# Patient Record
Sex: MALE | Race: WHITE | NOT HISPANIC OR LATINO | URBAN - METROPOLITAN AREA
[De-identification: names, ages, dates, MRNs, and addresses within clinical notes are randomized per-mention and may not be internally consistent; named-entity substitution may affect disease eponyms.]

---

## 2023-10-03 ENCOUNTER — APPOINTMENT (OUTPATIENT)
Dept: RADIOLOGY | Facility: MEDICAL CENTER | Age: 63
End: 2023-10-03
Payer: COMMERCIAL

## 2023-10-03 VITALS
SYSTOLIC BLOOD PRESSURE: 148 MMHG | HEART RATE: 69 BPM | WEIGHT: 243.8 LBS | BODY MASS INDEX: 36.11 KG/M2 | HEIGHT: 69 IN | DIASTOLIC BLOOD PRESSURE: 80 MMHG

## 2023-10-03 DIAGNOSIS — M25.562 PAIN IN BOTH KNEES, UNSPECIFIED CHRONICITY: ICD-10-CM

## 2023-10-03 DIAGNOSIS — M25.562 CHRONIC PAIN OF BOTH KNEES: ICD-10-CM

## 2023-10-03 DIAGNOSIS — M17.0 BILATERAL PRIMARY OSTEOARTHRITIS OF KNEE: Primary | ICD-10-CM

## 2023-10-03 DIAGNOSIS — G89.29 CHRONIC PAIN OF BOTH KNEES: ICD-10-CM

## 2023-10-03 DIAGNOSIS — M25.561 PAIN IN BOTH KNEES, UNSPECIFIED CHRONICITY: ICD-10-CM

## 2023-10-03 DIAGNOSIS — M25.561 CHRONIC PAIN OF BOTH KNEES: ICD-10-CM

## 2023-10-03 PROCEDURE — 99204 OFFICE O/P NEW MOD 45 MIN: CPT | Performed by: STUDENT IN AN ORGANIZED HEALTH CARE EDUCATION/TRAINING PROGRAM

## 2023-10-03 PROCEDURE — 20610 DRAIN/INJ JOINT/BURSA W/O US: CPT | Performed by: STUDENT IN AN ORGANIZED HEALTH CARE EDUCATION/TRAINING PROGRAM

## 2023-10-03 PROCEDURE — 77073 BONE LENGTH STUDIES: CPT

## 2023-10-03 PROCEDURE — 73564 X-RAY EXAM KNEE 4 OR MORE: CPT

## 2023-10-03 RX ORDER — EMPAGLIFLOZIN, METFORMIN HYDROCHLORIDE 12.5; 1 MG/1; MG/1
1 TABLET, EXTENDED RELEASE ORAL DAILY
COMMUNITY
Start: 2023-09-19

## 2023-10-03 RX ORDER — BISOPROLOL FUMARATE AND HYDROCHLOROTHIAZIDE 10; 6.25 MG/1; MG/1
1 TABLET ORAL DAILY
COMMUNITY
Start: 2023-09-19

## 2023-10-03 RX ORDER — ASPIRIN 81 MG/1
81 TABLET ORAL
COMMUNITY

## 2023-10-03 RX ORDER — ROSUVASTATIN CALCIUM 10 MG/1
10 TABLET, COATED ORAL DAILY
COMMUNITY
Start: 2023-09-19

## 2023-10-03 RX ORDER — LISINOPRIL 5 MG/1
5 TABLET ORAL DAILY
COMMUNITY
Start: 2023-09-19

## 2023-10-03 RX ORDER — BUPIVACAINE HYDROCHLORIDE 2.5 MG/ML
4 INJECTION, SOLUTION INFILTRATION; PERINEURAL
Status: COMPLETED | OUTPATIENT
Start: 2023-10-03 | End: 2023-10-03

## 2023-10-03 RX ORDER — TRIAMCINOLONE ACETONIDE 40 MG/ML
40 INJECTION, SUSPENSION INTRA-ARTICULAR; INTRAMUSCULAR
Status: COMPLETED | OUTPATIENT
Start: 2023-10-03 | End: 2023-10-03

## 2023-10-03 RX ORDER — DULAGLUTIDE 1.5 MG/.5ML
INJECTION, SOLUTION SUBCUTANEOUS
COMMUNITY
Start: 2023-09-23

## 2023-10-03 RX ADMIN — TRIAMCINOLONE ACETONIDE 40 MG: 40 INJECTION, SUSPENSION INTRA-ARTICULAR; INTRAMUSCULAR at 13:30

## 2023-10-03 RX ADMIN — BUPIVACAINE HYDROCHLORIDE 4 ML: 2.5 INJECTION, SOLUTION INFILTRATION; PERINEURAL at 13:30

## 2023-10-03 NOTE — PROGRESS NOTES
Knee New Office Note    Assessment:     1. Bilateral primary osteoarthritis of knee    2. Chronic pain of both knees        Plan:     Problem List Items Addressed This Visit    None  Visit Diagnoses     Bilateral primary osteoarthritis of knee    -  Primary    Chronic pain of both knees        Relevant Orders    XR knee 4+ vw right injury    XR knee 4+ vw left injury    XR bone length (scanogram)          Findings today are consistent with bilateral knee osteoarthritis. Imaging and prognosis was reviewed with the patient today. Discussed treatment options including continued observation, low impact exercises, physical therapy, cortisone injection, visco injection, versus surgical intervention. Cortisone steroid injection was administered to the left knee today. Patient should avoid strenuous activities for the next 1-2 days. Patient should avoid vaccines for the next 2 weeks if possible. He should monitor glucose levels for the next 7 to 10 days. Can apply cold compress for soreness. If patient feels relief with the cortisone injections, procedure can be repeated every 3 months. If patient sees minimal/no relief with cortisone injection, treatment with VISCO injections can be further discussed. Consider cortisone injection to the right knee in 3-4 weeks. Subjective:     Patient ID: Allyson Xiong is a 58 y.o. male. Chief Complaint:  HPI:  58 y.o. male presents to the office for evaluation of bilateral knee pain gradually worsening over the past few years. He notes history of left knee arthroscopy with menisectomy several years ago. He is experiencing medial knee pain, left worse than right. His pain worsens with activities. He takes naproxen to control his pain. His pain does wake him up throughout the night. He was seen at [facility] roughly 6 months ago where he received a left knee cortisone injection which was beneficial for a short period of time.      Allergy:  No Known Allergies  Medications:  all current active meds have been reviewed  Past Medical History:  No past medical history on file. Past Surgical History:  No past surgical history on file. Family History:  No family history on file. Social History:  Social History     Substance and Sexual Activity   Alcohol Use Not on file     Social History     Substance and Sexual Activity   Drug Use Not on file     Social History     Tobacco Use   Smoking Status Not on file   Smokeless Tobacco Not on file           ROS:  General: Per HPI  Skin: Negative, except if noted below  HEENT: Negative  Respiratory: Negative  Cardiovascular: Negative  Gastrointestinal: Negative  Urinary: Negative  Vascular: Negative  Musculoskeletal: Positive per HPI   Neurologic: Positive per HPI  Endocrine: Negative    Objective:  BP Readings from Last 1 Encounters:   10/03/23 148/80      Wt Readings from Last 1 Encounters:   10/03/23 111 kg (243 lb 12.8 oz)        Respiratory:   non-labored respirations    Lymphatics:  no palpable lymph nodes    Gait:   antalgic    Neurologic:   Alert and oriented times 3  Patient with normal sensation except as noted below  Deep tendon reflexes 2+ except as noted in MSK exam    Bilateral Lower Extremity:  Left Knee: Inspection:  Skin intact    Overall limb alignment varus    Effusion: none    ROM full with pain    Extensor Lag: none    Palpation: medial Joint line tenderness to palpation    AP Stability at 90 deg stable    M/L stability in full extension stable    M/L stability in midflexion laxity present    Motor: 5/5 Q/HS/TA/GS/P    Pulses: 2+ DP / 2+ PT    SILT DP/SP/S/S/TN    Right knee:      Inspection:  Skin intact    Overall limb alignment mild varus    Effusion: none    ROM full with mild pain    Extensor Lag: none    Palpation: medial Joint line tenderness to palpation    AP Stability at 90 deg stable    M/L stability in full extension stable    M/L stability in midflexion stable    Motor: 5/5 Q/HS/TA/GS/P    Pulses: 2+ DP / 2+ PT    SILT DP/SP/S/S/TN    Imaging:  My interpretation XR AP scanogram/AP bilateral knee/lateral/trejo/sunrise bilateral knee: severe medial joint space narrowing, subchondral sclerosis, subchondral cysts, osteophyte formation. No fracture or dislocation. BMI:   Estimated body mass index is 36 kg/m² as calculated from the following:    Height as of this encounter: 5' 9" (1.753 m). Weight as of this encounter: 111 kg (243 lb 12.8 oz). BSA:   Estimated body surface area is 2.25 meters squared as calculated from the following:    Height as of this encounter: 5' 9" (1.753 m). Weight as of this encounter: 111 kg (243 lb 12.8 oz). Large joint arthrocentesis: L knee  Universal Protocol:  Consent: Verbal consent obtained. Risks and benefits: risks, benefits and alternatives were discussed  Consent given by: patient  Time out: Immediately prior to procedure a "time out" was called to verify the correct patient, procedure, equipment, support staff and site/side marked as required.   Timeout called at: 10/3/2023 2:03 PM.  Patient understanding: patient states understanding of the procedure being performed  Site marked: the operative site was marked  Patient identity confirmed: verbally with patient    Supporting Documentation  Indications: pain   Procedure Details  Location: knee - L knee  Preparation: Patient was prepped and draped in the usual sterile fashion  Needle size: 22 G  Medications administered: 4 mL bupivacaine 0.25 %; 40 mg triamcinolone acetonide 40 mg/mL    Patient tolerance: patient tolerated the procedure well with no immediate complications  Dressing:  Sterile dressing applied          Scribe Attestation    I,:  Loretta Franco am acting as a scribe while in the presence of the attending physician.:       I,:  Yaw Martin DO personally performed the services described in this documentation    as scribed in my presence.:

## 2023-10-31 VITALS
HEART RATE: 60 BPM | SYSTOLIC BLOOD PRESSURE: 155 MMHG | DIASTOLIC BLOOD PRESSURE: 92 MMHG | WEIGHT: 241 LBS | BODY MASS INDEX: 35.7 KG/M2 | HEIGHT: 69 IN

## 2023-10-31 DIAGNOSIS — M17.0 BILATERAL PRIMARY OSTEOARTHRITIS OF KNEE: Primary | ICD-10-CM

## 2023-10-31 DIAGNOSIS — G89.29 CHRONIC PAIN OF BOTH KNEES: ICD-10-CM

## 2023-10-31 DIAGNOSIS — M25.562 CHRONIC PAIN OF BOTH KNEES: ICD-10-CM

## 2023-10-31 DIAGNOSIS — M25.561 CHRONIC PAIN OF BOTH KNEES: ICD-10-CM

## 2023-10-31 PROCEDURE — 99214 OFFICE O/P EST MOD 30 MIN: CPT | Performed by: STUDENT IN AN ORGANIZED HEALTH CARE EDUCATION/TRAINING PROGRAM

## 2023-10-31 RX ORDER — TRANEXAMIC ACID 10 MG/ML
1000 INJECTION, SOLUTION INTRAVENOUS ONCE
OUTPATIENT
Start: 2023-10-31 | End: 2023-10-31

## 2023-10-31 RX ORDER — MULTIVIT-MIN/IRON FUM/FOLIC AC 7.5 MG-4
1 TABLET ORAL DAILY
Qty: 30 TABLET | Refills: 1 | Status: SHIPPED | OUTPATIENT
Start: 2023-10-31

## 2023-10-31 RX ORDER — CEFAZOLIN SODIUM 2 G/50ML
2000 SOLUTION INTRAVENOUS ONCE
OUTPATIENT
Start: 2023-10-31 | End: 2023-10-31

## 2023-10-31 RX ORDER — SODIUM CHLORIDE, SODIUM LACTATE, POTASSIUM CHLORIDE, CALCIUM CHLORIDE 600; 310; 30; 20 MG/100ML; MG/100ML; MG/100ML; MG/100ML
125 INJECTION, SOLUTION INTRAVENOUS CONTINUOUS
OUTPATIENT
Start: 2023-10-31

## 2023-10-31 RX ORDER — ASCORBIC ACID 500 MG
500 TABLET ORAL 2 TIMES DAILY
Qty: 60 TABLET | Refills: 1 | Status: SHIPPED | OUTPATIENT
Start: 2023-10-31

## 2023-10-31 RX ORDER — CHLORHEXIDINE GLUCONATE 4 G/100ML
SOLUTION TOPICAL DAILY PRN
OUTPATIENT
Start: 2023-10-31

## 2023-10-31 RX ORDER — GABAPENTIN 300 MG/1
300 CAPSULE ORAL ONCE
OUTPATIENT
Start: 2023-10-31 | End: 2023-10-31

## 2023-10-31 RX ORDER — FOLIC ACID 1 MG/1
1 TABLET ORAL DAILY
Qty: 30 TABLET | Refills: 1 | Status: SHIPPED | OUTPATIENT
Start: 2023-10-31

## 2023-10-31 RX ORDER — ACETAMINOPHEN 325 MG/1
975 TABLET ORAL ONCE
OUTPATIENT
Start: 2023-10-31 | End: 2023-10-31

## 2023-10-31 RX ORDER — CHLORHEXIDINE GLUCONATE ORAL RINSE 1.2 MG/ML
15 SOLUTION DENTAL ONCE
OUTPATIENT
Start: 2023-10-31 | End: 2023-10-31

## 2023-10-31 RX ORDER — MELATONIN
1000 DAILY
Qty: 60 TABLET | Refills: 0 | Status: SHIPPED | OUTPATIENT
Start: 2023-10-31

## 2023-10-31 NOTE — PROGRESS NOTES
Knee Follow up Office Note    Assessment:     1. Bilateral primary osteoarthritis of knee    2. Chronic pain of both knees          Plan:     Problem List Items Addressed This Visit    None  Visit Diagnoses       Bilateral primary osteoarthritis of knee    -  Primary    Relevant Medications    ascorbic acid (VITAMIN C) 466 MG tablet    folic acid (FOLVITE) 1 mg tablet    Multiple Vitamins-Minerals (multivitamin with minerals) tablet    cholecalciferol (VITAMIN D3) 1,000 units tablet    Other Relevant Orders    MRI knee left  wo contrast    Case request operating room: ARTHROPLASTY KNEE UNICOMPARTMENTAL vs TKA (Completed)    Comprehensive metabolic panel    Hemoglobin A1C W/EAG Estimation    CBC and differential    Anemia Panel w/Reflex    Protime-INR    APTT    Type and screen    Ambulatory referral to [facility]    Ambulatory referral to Physical Therapy    EKG 12 lead    Chronic pain of both knees                57 y/o male with bilateral knee osteoarthritis, left worse than right. Xrays of the left knee reviewed showing severe arthritic changes with decreased joint space and osteophyte formation localized to the medial compartment, as well as varus alignment. On physical exam he has maintained good motion, but with pain localized over the medial joint line with no significant effusion. Discussed treatment options including continued observation, low impact exercises, physical therapy, cortisone injection, visco injection, versus surgical intervention. Cortisone steroid injection only lasted him about 1 day. We discussed trying visco, but with how little relief he had with cortisone, he may not see significant benefit. We did discuss surgical treatment option of medial unicompartmental knee replacement vs TKA. Based on the area of his arthritis, he may be a candidate for a medial compartment uni, however we recommend ordering an MRI to eval further for surgical planning and integrity of ACL.   Patient would like to proceed surgically with UKA in the spring after his vacation in February. The patient has elected to proceed with Left knee medial UKA vs TKA. Risks and benefits of surgery to include but not limited to bleeding, infection, damage to surrounding structures, hardware failure, instability, fracture, dislocation, need for further surgery, continued pain, stiffness, blood clots, stroke, and heart attack was discussed with the patient. Informed consent was signed today in the office. The patient has met with our surgical schedulers and our preoperative joint replacement pathway has been initiated. All questions were answered. Patient will follow-up 2 weeks post operatively. BMI is appropriate at 35.59 and is a nonsmoker. Subjective:     Patient ID: August Wise is a 58 y.o. male. Chief Complaint:  HPI:  58 y.o. male presents to the office for a follow up evaluation of bilateral knee pain gradually worsening over the past few years. At last visit it was discussed that he has OA of the bilateral knees, left worse than right. He notes history of left knee arthroscopy with menisectomy several years ago. He is experiencing medial knee pain, left worse than right. His pain worsens with activities. He takes naproxen to control his pain. His pain does wake him up throughout the night. At his last visit about 1 month ago he was given a cortisone injection into the left knee, which lasted only about 1 day. He is here today to discuss other treatment options. Allergy:  No Known Allergies  Medications:  all current active meds have been reviewed  Past Medical History:  History reviewed. No pertinent past medical history. Past Surgical History:  History reviewed. No pertinent surgical history. Family History:  History reviewed. No pertinent family history.   Social History:  Social History     Substance and Sexual Activity   Alcohol Use Yes     Social History     Substance and Sexual Activity   Drug Use Never     Social History     Tobacco Use   Smoking Status Former    Types: Cigarettes   Smokeless Tobacco Never           ROS:  General: Per HPI  Skin: Negative, except if noted below  HEENT: Negative  Respiratory: Negative  Cardiovascular: Negative  Gastrointestinal: Negative  Urinary: Negative  Vascular: Negative  Musculoskeletal: Positive per HPI   Neurologic: Positive per HPI  Endocrine: Negative    Objective:  BP Readings from Last 1 Encounters:   10/31/23 155/92      Wt Readings from Last 1 Encounters:   10/31/23 109 kg (241 lb)        Respiratory:   non-labored respirations    Lymphatics:  no palpable lymph nodes    Gait:   antalgic    Neurologic:   Alert and oriented times 3  Patient with normal sensation except as noted below  Deep tendon reflexes 2+ except as noted in MSK exam    Bilateral Lower Extremity:  Left Knee: Inspection:  Skin intact    Overall limb alignment varus    Effusion: none    ROM full with pain    Extensor Lag: none    Palpation: medial Joint line tenderness to palpation    AP Stability at 90 deg stable    M/L stability in full extension stable    M/L stability in midflexion laxity present    Motor: 5/5 Q/HS/TA/GS/P    Pulses: 2+ DP / 2+ PT    SILT DP/SP/S/S/TN    Right knee: Inspection:  Skin intact    Overall limb alignment mild varus    Effusion: none    ROM full with mild pain    Extensor Lag: none    Palpation: medial Joint line tenderness to palpation    AP Stability at 90 deg stable    M/L stability in full extension stable    M/L stability in midflexion stable    Motor: 5/5 Q/HS/TA/GS/P    Pulses: 2+ DP / 2+ PT    SILT DP/SP/S/S/TN    Imaging:  My interpretation XR AP scanogram/AP bilateral knee/lateral/trejo/sunrise bilateral knee: severe medial joint space narrowing, subchondral sclerosis, subchondral cysts, osteophyte formation. No fracture or dislocation.      BMI:   Estimated body mass index is 35.59 kg/m² as calculated from the following:    Height as of this encounter: 5' 9" (1.753 m). Weight as of this encounter: 109 kg (241 lb). BSA:   Estimated body surface area is 2.23 meters squared as calculated from the following:    Height as of this encounter: 5' 9" (1.753 m). Weight as of this encounter: 109 kg (241 lb).              Scribe Attestation      I,:  Silvestre Ash PA-C am acting as a scribe while in the presence of the attending physician.:       I,:  Oliver Tipton, DO personally performed the services described in this documentation    as scribed in my presence.:

## 2023-11-07 ENCOUNTER — HOSPITAL ENCOUNTER (OUTPATIENT)
Dept: MRI IMAGING | Facility: CLINIC | Age: 63
Discharge: HOME | End: 2023-11-07
Payer: COMMERCIAL

## 2023-11-07 DIAGNOSIS — M17.0 BILATERAL PRIMARY OSTEOARTHRITIS OF KNEE: ICD-10-CM

## 2023-11-07 PROCEDURE — 73721 MRI JNT OF LWR EXTRE W/O DYE: CPT

## 2023-11-07 PROCEDURE — G1004 CDSM NDSC: HCPCS

## 2024-01-17 ENCOUNTER — TELEPHONE (OUTPATIENT)
Age: 64
End: 2024-01-17

## 2024-01-17 NOTE — TELEPHONE ENCOUNTER
Caller: Balta Monson     Doctor/Office: Not spa     Call regarding :  Ortho     Call was transferred to: Ortho

## 2024-01-17 NOTE — TELEPHONE ENCOUNTER
Caller: patient     Doctor: marely    Reason for call: Received a message that appointment did not get authorized   Please advise     Call back#: [phone]

## 2024-01-22 ENCOUNTER — TELEPHONE (OUTPATIENT)
Dept: OBGYN CLINIC | Facility: HOSPITAL | Age: 64
End: 2024-01-22

## 2024-02-09 LAB
DME PARACHUTE DELIVERY DATE ACTUAL: NORMAL
DME PARACHUTE DELIVERY DATE REQUESTED: NORMAL
DME PARACHUTE ITEM DESCRIPTION: NORMAL
DME PARACHUTE ORDER STATUS: NORMAL
DME PARACHUTE SUPPLIER NAME: NORMAL
DME PARACHUTE SUPPLIER PHONE: NORMAL

## 2024-02-12 PROBLEM — E11.59 HYPERTENSION ASSOCIATED WITH TYPE 2 DIABETES MELLITUS: Status: ACTIVE | Noted: 2017-10-11

## 2024-02-12 PROBLEM — E78.5 HYPERLIPEMIA: Status: ACTIVE | Noted: 2024-02-12

## 2024-02-12 PROBLEM — E66.812 CLASS 2 OBESITY DUE TO EXCESS CALORIES WITH BODY MASS INDEX (BMI) OF 35.0 TO 35.9 IN ADULT: Status: ACTIVE | Noted: 2020-03-06

## 2024-02-12 PROBLEM — J44.9 COPD, SEVERITY TO BE DETERMINED (HCC): Status: ACTIVE | Noted: 2018-09-04

## 2024-02-12 PROBLEM — M17.12 PRIMARY OSTEOARTHRITIS OF LEFT KNEE: Status: ACTIVE | Noted: 2024-02-12

## 2024-02-12 PROBLEM — I15.2 HYPERTENSION ASSOCIATED WITH TYPE 2 DIABETES MELLITUS: Status: ACTIVE | Noted: 2017-10-11

## 2024-02-12 PROBLEM — R80.9 MICROALBUMINURIA DUE TO TYPE 2 DIABETES MELLITUS: Status: ACTIVE | Noted: 2018-09-04

## 2024-02-12 PROBLEM — E66.09 CLASS 2 OBESITY DUE TO EXCESS CALORIES WITH BODY MASS INDEX (BMI) OF 35.0 TO 35.9 IN ADULT: Status: ACTIVE | Noted: 2020-03-06

## 2024-02-12 PROBLEM — E11.29 MICROALBUMINURIA DUE TO TYPE 2 DIABETES MELLITUS: Status: ACTIVE | Noted: 2018-09-04

## 2024-02-19 ENCOUNTER — LAB REQUISITION (OUTPATIENT)
Dept: LAB | Facility: HOSPITAL | Age: 64
End: 2024-02-19
Payer: COMMERCIAL

## 2024-02-19 ENCOUNTER — OFFICE VISIT (OUTPATIENT)
Dept: LAB | Facility: HOSPITAL | Age: 64
End: 2024-02-19
Payer: COMMERCIAL

## 2024-02-19 ENCOUNTER — APPOINTMENT (OUTPATIENT)
Dept: LAB | Facility: HOSPITAL | Age: 64
End: 2024-02-19

## 2024-02-19 DIAGNOSIS — M17.0 BILATERAL PRIMARY OSTEOARTHRITIS OF KNEE: ICD-10-CM

## 2024-02-19 LAB
ABO GROUP BLD: NORMAL
ALBUMIN SERPL BCP-MCNC: 4.3 G/DL (ref 3.5–5)
ALP SERPL-CCNC: 53 U/L (ref 34–104)
ALT SERPL W P-5'-P-CCNC: 47 U/L (ref 7–52)
ANION GAP SERPL CALCULATED.3IONS-SCNC: 6 MMOL/L
APTT PPP: 30 SECONDS (ref 23–37)
AST SERPL W P-5'-P-CCNC: 32 U/L (ref 13–39)
ATRIAL RATE: 0 BPM
ATRIAL RATE: 64 BPM
BASOPHILS # BLD AUTO: 0.02 THOUSANDS/ÂΜL (ref 0–0.1)
BASOPHILS NFR BLD AUTO: 0 % (ref 0–1)
BILIRUB SERPL-MCNC: 0.52 MG/DL (ref 0.2–1)
BLD GP AB SCN SERPL QL: NEGATIVE
BUN SERPL-MCNC: 19 MG/DL (ref 5–25)
CALCIUM SERPL-MCNC: 9.4 MG/DL (ref 8.4–10.2)
CHLORIDE SERPL-SCNC: 105 MMOL/L (ref 96–108)
CO2 SERPL-SCNC: 27 MMOL/L (ref 21–32)
CREAT SERPL-MCNC: 0.75 MG/DL (ref 0.6–1.3)
EOSINOPHIL # BLD AUTO: 0.25 THOUSAND/ÂΜL (ref 0–0.61)
EOSINOPHIL NFR BLD AUTO: 4 % (ref 0–6)
ERYTHROCYTE [DISTWIDTH] IN BLOOD BY AUTOMATED COUNT: 13.6 % (ref 11.6–15.1)
EST. AVERAGE GLUCOSE BLD GHB EST-MCNC: 154 MG/DL
GFR SERPL CREATININE-BSD FRML MDRD: 97 ML/MIN/1.73SQ M
GLUCOSE P FAST SERPL-MCNC: 141 MG/DL (ref 65–99)
HBA1C MFR BLD: 7 %
HCT VFR BLD AUTO: 48 % (ref 36.5–49.3)
HGB BLD-MCNC: 16.1 G/DL (ref 12–17)
IMM GRANULOCYTES # BLD AUTO: 0.02 THOUSAND/UL (ref 0–0.2)
IMM GRANULOCYTES NFR BLD AUTO: 0 % (ref 0–2)
INR PPP: 0.93 (ref 0.84–1.19)
LYMPHOCYTES # BLD AUTO: 2.3 THOUSANDS/ÂΜL (ref 0.6–4.47)
LYMPHOCYTES NFR BLD AUTO: 35 % (ref 14–44)
MCH RBC QN AUTO: 30.3 PG (ref 26.8–34.3)
MCHC RBC AUTO-ENTMCNC: 33.5 G/DL (ref 31.4–37.4)
MCV RBC AUTO: 90 FL (ref 82–98)
MONOCYTES # BLD AUTO: 0.71 THOUSAND/ÂΜL (ref 0.17–1.22)
MONOCYTES NFR BLD AUTO: 11 % (ref 4–12)
NEUTROPHILS # BLD AUTO: 3.31 THOUSANDS/ÂΜL (ref 1.85–7.62)
NEUTS SEG NFR BLD AUTO: 50 % (ref 43–75)
NRBC BLD AUTO-RTO: 0 /100 WBCS
P AXIS: 28 DEGREES
PLATELET # BLD AUTO: 203 THOUSANDS/UL (ref 149–390)
PMV BLD AUTO: 10.3 FL (ref 8.9–12.7)
POTASSIUM SERPL-SCNC: 4.1 MMOL/L (ref 3.5–5.3)
PR INTERVAL: 196 MS
PROT SERPL-MCNC: 7 G/DL (ref 6.4–8.4)
PROTHROMBIN TIME: 13 SECONDS (ref 11.6–14.5)
QRS AXIS: 0 DEGREES
QRS AXIS: 67 DEGREES
QRSD INTERVAL: 0 MS
QRSD INTERVAL: 102 MS
QT INTERVAL: 0 MS
QT INTERVAL: 412 MS
QTC INTERVAL: 0 MS
QTC INTERVAL: 425 MS
RBC # BLD AUTO: 5.31 MILLION/UL (ref 3.88–5.62)
RH BLD: POSITIVE
SODIUM SERPL-SCNC: 138 MMOL/L (ref 135–147)
SPECIMEN EXPIRATION DATE: NORMAL
T WAVE AXIS: 0 DEGREES
T WAVE AXIS: 59 DEGREES
VENTRICULAR RATE: 0 BPM
VENTRICULAR RATE: 64 BPM
WBC # BLD AUTO: 6.61 THOUSAND/UL (ref 4.31–10.16)

## 2024-02-19 PROCEDURE — 83036 HEMOGLOBIN GLYCOSYLATED A1C: CPT

## 2024-02-19 PROCEDURE — 86850 RBC ANTIBODY SCREEN: CPT | Performed by: PHYSICIAN ASSISTANT

## 2024-02-19 PROCEDURE — 80053 COMPREHEN METABOLIC PANEL: CPT

## 2024-02-19 PROCEDURE — 86900 BLOOD TYPING SEROLOGIC ABO: CPT | Performed by: PHYSICIAN ASSISTANT

## 2024-02-19 PROCEDURE — 85610 PROTHROMBIN TIME: CPT

## 2024-02-19 PROCEDURE — 93005 ELECTROCARDIOGRAM TRACING: CPT

## 2024-02-19 PROCEDURE — 86901 BLOOD TYPING SEROLOGIC RH(D): CPT | Performed by: PHYSICIAN ASSISTANT

## 2024-02-19 PROCEDURE — 85025 COMPLETE CBC W/AUTO DIFF WBC: CPT

## 2024-02-19 PROCEDURE — 85730 THROMBOPLASTIN TIME PARTIAL: CPT

## 2024-02-19 PROCEDURE — 36415 COLL VENOUS BLD VENIPUNCTURE: CPT

## 2024-02-19 PROCEDURE — 93010 ELECTROCARDIOGRAM REPORT: CPT | Performed by: INTERNAL MEDICINE

## 2024-02-21 PROBLEM — E11.9 TYPE 2 DIABETES MELLITUS WITHOUT COMPLICATION, WITHOUT LONG-TERM CURRENT USE OF INSULIN (HCC): Status: ACTIVE | Noted: 2024-02-21

## 2024-02-21 NOTE — PROGRESS NOTES
Internal Medicine Pre-Operative Evaluation:     Reason for Visit: Pre-operative Evaluation for Risk Stratification and Optimization    Patient ID: Balta Monson is a 63 y.o. male.     Surgery: Arthroplasty of left knee  Referring Provider: Dr Farrar      Recommendations to Proceed withSurgery    Patient is considered to be Low risk for Medium risk procedure.     After evaluation and discussion with patient with emphasis that all surgery has some degree of inherent risk it is determined this procedure is of acceptable risk  medically.    Patient may proceed with planned procedure      Assessment    Pre-operative Medical Evaluation for planned surgery  Recommendations as listed in PLAN section below  Contact surgical nurse  navigator with any questions regarding preoperative plan or schedule.      Problem List Items Addressed This Visit          Unprioritized    Class 2 obesity due to excess calories with body mass index (BMI) of 35.0 to 35.9 in adult    Hyperlipemia     Continue low cholesterol diet and statin therapy           Hypertension associated with type 2 diabetes mellitus        Stable  Cont current medications as directed  Monitor post operative BP   Hold bisoprolol/hctz/lisinopril the morning of surgery            Primary osteoarthritis of left knee     Recommend ongoing attempts at weight loss  Current BMI meets criteria of <40 per [facility] preoperative qualifications           Type 2 diabetes mellitus without complication, without long-term current use of insulin ([facility])       Lab Results   Component Value Date    HGBA1C 7.0 (H) 02/19/2024     Hold trulicity week before surgery          Other Visit Diagnoses       Preop exam for internal medicine    -  Primary                 Plan:     1. Further preoperative workup as follows:   - none no further testing required may proceed with surgery    2. Medication Management/Recommendations:   - hold ACE / ARB morning of surgery unless given other instructions by  your provider  - hold diuretic / water pill  morning of surgery unless given other instructions by your provider  - Insulin Management: trulicity  - Hold the following oral diabetes medication morning of surgery: synjardy  - hold aspirin 7 days prior to surgery  - avoid use of NSAID such as motrin, advil, aleve for 7 days prior to surgery  - hold all OTC herbal or nutritional supplements 7 days before surgery    3. Patient requires further consultation with:   No Consults Required    4. Discharge Planning / Barriers to Discharge  none identified - patients has post discharge therapy plan in place, transportation arranged for discharge day, adequate family support at home to assist with discharge to home.        Subjective:           History of Present Illness:     Balta Monson is a 63 y.o. male who presents to the office today for a preoperative consultation at the request of surgeon. The patient understands this is an elective procedure and not emergent. They are electing to undergo planned procedure with an understanding that all surgery has inherent risk. They have worked with their surgeon and failed conservative treatment measures. Today they present for preoperative risk assessment and recommendations for optimization in preparation for surgery.    Pt seen in surgical optimization center for upcoming proposed surgery. They have failed previous conservative measures and have elected surgical intervention.     Pt meets presurgical lab and BMI optimization goals.    Upon interview questioning patient is able to perform greater than 4 METs workload in daily life without any reported cardio-pulmonary symptoms.          ROS: No TIA's or unusual headaches, no dysphagia.  No prolonged cough. No dyspnea or chest pain on exertion.  No abdominal pain, change in bowel habits, black or bloody stools.  No urinary tract or BPH symptoms.  Positive reported pain in arthritic joint. Positive difficulty with gait. No skin  "rashes or issues.      Objective:    /78   Ht 5' 9\" (1.753 m)   Wt 110 kg (243 lb 9.6 oz)   BMI 35.97 kg/m²       General Appearance: no distress, conversive  HEENT: PERRLA, conjuctiva normal; oropharynx clear; mucous membranes moist;   Neck:  Supple, no lymphadenopathy or thyromegaly  Lungs: breath sounds normal, normal respiratory effort, no retractions, expiratory effort normal  CV: normal heart sounds S1/S2, PMI normal   ABD: soft non tender, no masses , no hepatic or splenomegaly  EXT: DP pulses intact, no lymphadenopathy, no edema  Skin: normal turgor, normal texture, no rash  Psych: affect normal, mood normal  Neuro: AAOx3        The following portions of the patient's history were reviewed and updated as appropriate: allergies, current medications, past family history, past medical history, past social history, past surgical history and problem list.     Past History:       Past Medical History:   Diagnosis Date   • COPD (chronic obstructive pulmonary disease) (HCC)    • CPAP (continuous positive airway pressure) dependence     Not using at present   • Diabetes mellitus (HCC)    • Hypertension    • Sleep apnea     Past Surgical History:   Procedure Laterality Date   • APPENDECTOMY     • CATARACT EXTRACTION     • KNEE ARTHROSCOPY     • SOFT TISSUE CYST EXCISION      groin          Social History     Tobacco Use   • Smoking status: Former     Current packs/day: 0.00     Types: Cigarettes     Quit date:      Years since quittin.1   • Smokeless tobacco: Never   Vaping Use   • Vaping status: Former   Substance Use Topics   • Alcohol use: Yes     Comment: Weekends 2 x   • Drug use: Never     History reviewed. No pertinent family history.       Allergies:     No Known Allergies     Current Medications:     Current Outpatient Medications   Medication Instructions   • ascorbic acid (VITAMIN C) 500 mg, Oral, 2 times daily   • aspirin (ECOTRIN LOW STRENGTH) 81 mg, Oral   • " "bisoprolol-hydrochlorothiazide (ZIAC) 10-6.25 MG per tablet 1 tablet, Oral, Daily   • cholecalciferol (VITAMIN D3) 1,000 Units, Oral, Daily   • folic acid (FOLVITE) 1 mg, Oral, Daily   • lisinopril (ZESTRIL) 5 mg, Oral, Daily   • Multiple Vitamins-Minerals (multivitamin with minerals) tablet 1 tablet, Oral, Daily   • rosuvastatin (CRESTOR) 10 mg, Oral, Daily   • Synjardy XR 12.5-1000 MG TB24 1 tablet, Oral, Daily   • Trulicity 1.5 MG/0.5ML injection INJECT 1.5 MG UNDER THE SKIN EVERY 7 DAYS           PRE-OP WORKSHEET DATA    Assessment of Pre-Operative Risks     MLJ Quality Hard Stops:  BMI (<40) : Estimated body mass index is 35.97 kg/m² as calculated from the following:    Height as of this encounter: 5' 9\" (1.753 m).    Weight as of this encounter: 110 kg (243 lb 9.6 oz).    Hgb ( >11):   Lab Results   Component Value Date    HGB 16.1 02/19/2024     HbA1c (<7.5) :   Lab Results   Component Value Date    HGBA1C 7.0 (H) 02/19/2024     GFR (>60) (Less then 45 = Nephrology consult):  Estimated Creatinine Clearance: 123.2 mL/min (by C-G formula based on SCr of 0.75 mg/dL).      Active Decompensated Chronic Conditions which would delay surgery  No acutely decompensated medical issues such as recent CVA, MI, new onset arrhythmia, severe aortic stenosis, CHF, uncontrolled COPD       Exercise Capacity: (if less the 4 mets consider functional assessment via cardiac stress testing or consultation)    Able to walk 2 blocks without symptoms?: Yes  Able to walk 1 flights without symptoms?: Yes    Assessment of intra and post operative respiratory, hemodynamic and thrombotic risks     Prior Anesthesia Reactions: No     Personal history of venous thromboembolic disease? No    History of steroid use > 5 mg for >2 weeks within last year? No      The patient's risk factors for cardiac complications include :  none    Balta Monson has an IN HOSPITAL cardiac risk of RCI RISK CLASS I (0 risk factors, risk of major cardiac compl. " appr. 0.5%) based on RCRI calculator    Cardiac Risk Estimation: per the Revised Cardiac Risk Index (Circ. 100:1043, 1999),        Pre-Op Data Reviewed:       Laboratory Results: I have personally reviewed the pertinent laboratory results/reports     EKG:I have personally reviewed pertinent reports.  . I personally reviewed and interpreted available tracings in the electronic medical record    Normal sinus rhythm  Normal ECG  Confirmed by Noah Agosto ([id number]) on 2/19/2024 4:22:44 PM       OLD RECORDS: reviewed old records in the chart review section if EHR on day of visit.    Previous cardiopulmonary studies within the past year:  Echocardiogram: no  Cardiac Catheterization: no  Stress Test: no      Time of visit including pre-visit chart review, visit and post-visit coordination of plan and care , review of pre-surgical lab work, preparation and time spent documenting note in electronic medical record, time spent face-to-face in physical examination answering patient questions by care team 45 minutes             [facility]

## 2024-02-21 NOTE — ASSESSMENT & PLAN NOTE
Lab Results   Component Value Date    HGBA1C 7.0 (H) 02/19/2024     Hold trulicity week before surgery

## 2024-02-21 NOTE — H&P (VIEW-ONLY)
Internal Medicine Pre-Operative Evaluation:     Reason for Visit: Pre-operative Evaluation for Risk Stratification and Optimization    Patient ID: Balta Monson is a 63 y.o. male.     Surgery: Arthroplasty of left knee  Referring Provider: Dr Farrar      Recommendations to Proceed withSurgery    Patient is considered to be Low risk for Medium risk procedure.     After evaluation and discussion with patient with emphasis that all surgery has some degree of inherent risk it is determined this procedure is of acceptable risk  medically.    Patient may proceed with planned procedure      Assessment    Pre-operative Medical Evaluation for planned surgery  Recommendations as listed in PLAN section below  Contact surgical nurse  navigator with any questions regarding preoperative plan or schedule.      Problem List Items Addressed This Visit          Unprioritized    Class 2 obesity due to excess calories with body mass index (BMI) of 35.0 to 35.9 in adult    Hyperlipemia     Continue low cholesterol diet and statin therapy           Hypertension associated with type 2 diabetes mellitus        Stable  Cont current medications as directed  Monitor post operative BP   Hold bisoprolol/hctz/lisinopril the morning of surgery            Primary osteoarthritis of left knee     Recommend ongoing attempts at weight loss  Current BMI meets criteria of <40 per [facility] preoperative qualifications           Type 2 diabetes mellitus without complication, without long-term current use of insulin ([facility])       Lab Results   Component Value Date    HGBA1C 7.0 (H) 02/19/2024     Hold trulicity week before surgery          Other Visit Diagnoses       Preop exam for internal medicine    -  Primary                 Plan:     1. Further preoperative workup as follows:   - none no further testing required may proceed with surgery    2. Medication Management/Recommendations:   - hold ACE / ARB morning of surgery unless given other instructions by  your provider  - hold diuretic / water pill  morning of surgery unless given other instructions by your provider  - Insulin Management: trulicity  - Hold the following oral diabetes medication morning of surgery: synjardy  - hold aspirin 7 days prior to surgery  - avoid use of NSAID such as motrin, advil, aleve for 7 days prior to surgery  - hold all OTC herbal or nutritional supplements 7 days before surgery    3. Patient requires further consultation with:   No Consults Required    4. Discharge Planning / Barriers to Discharge  none identified - patients has post discharge therapy plan in place, transportation arranged for discharge day, adequate family support at home to assist with discharge to home.        Subjective:           History of Present Illness:     Balta Monson is a 63 y.o. male who presents to the office today for a preoperative consultation at the request of surgeon. The patient understands this is an elective procedure and not emergent. They are electing to undergo planned procedure with an understanding that all surgery has inherent risk. They have worked with their surgeon and failed conservative treatment measures. Today they present for preoperative risk assessment and recommendations for optimization in preparation for surgery.    Pt seen in surgical optimization center for upcoming proposed surgery. They have failed previous conservative measures and have elected surgical intervention.     Pt meets presurgical lab and BMI optimization goals.    Upon interview questioning patient is able to perform greater than 4 METs workload in daily life without any reported cardio-pulmonary symptoms.          ROS: No TIA's or unusual headaches, no dysphagia.  No prolonged cough. No dyspnea or chest pain on exertion.  No abdominal pain, change in bowel habits, black or bloody stools.  No urinary tract or BPH symptoms.  Positive reported pain in arthritic joint. Positive difficulty with gait. No skin  "rashes or issues.      Objective:    /78   Ht 5' 9\" (1.753 m)   Wt 110 kg (243 lb 9.6 oz)   BMI 35.97 kg/m²       General Appearance: no distress, conversive  HEENT: PERRLA, conjuctiva normal; oropharynx clear; mucous membranes moist;   Neck:  Supple, no lymphadenopathy or thyromegaly  Lungs: breath sounds normal, normal respiratory effort, no retractions, expiratory effort normal  CV: normal heart sounds S1/S2, PMI normal   ABD: soft non tender, no masses , no hepatic or splenomegaly  EXT: DP pulses intact, no lymphadenopathy, no edema  Skin: normal turgor, normal texture, no rash  Psych: affect normal, mood normal  Neuro: AAOx3        The following portions of the patient's history were reviewed and updated as appropriate: allergies, current medications, past family history, past medical history, past social history, past surgical history and problem list.     Past History:       Past Medical History:   Diagnosis Date   • COPD (chronic obstructive pulmonary disease) (HCC)    • CPAP (continuous positive airway pressure) dependence     Not using at present   • Diabetes mellitus (HCC)    • Hypertension    • Sleep apnea     Past Surgical History:   Procedure Laterality Date   • APPENDECTOMY     • CATARACT EXTRACTION     • KNEE ARTHROSCOPY     • SOFT TISSUE CYST EXCISION      groin          Social History     Tobacco Use   • Smoking status: Former     Current packs/day: 0.00     Types: Cigarettes     Quit date:      Years since quittin.1   • Smokeless tobacco: Never   Vaping Use   • Vaping status: Former   Substance Use Topics   • Alcohol use: Yes     Comment: Weekends 2 x   • Drug use: Never     History reviewed. No pertinent family history.       Allergies:     No Known Allergies     Current Medications:     Current Outpatient Medications   Medication Instructions   • ascorbic acid (VITAMIN C) 500 mg, Oral, 2 times daily   • aspirin (ECOTRIN LOW STRENGTH) 81 mg, Oral   • " "bisoprolol-hydrochlorothiazide (ZIAC) 10-6.25 MG per tablet 1 tablet, Oral, Daily   • cholecalciferol (VITAMIN D3) 1,000 Units, Oral, Daily   • folic acid (FOLVITE) 1 mg, Oral, Daily   • lisinopril (ZESTRIL) 5 mg, Oral, Daily   • Multiple Vitamins-Minerals (multivitamin with minerals) tablet 1 tablet, Oral, Daily   • rosuvastatin (CRESTOR) 10 mg, Oral, Daily   • Synjardy XR 12.5-1000 MG TB24 1 tablet, Oral, Daily   • Trulicity 1.5 MG/0.5ML injection INJECT 1.5 MG UNDER THE SKIN EVERY 7 DAYS           PRE-OP WORKSHEET DATA    Assessment of Pre-Operative Risks     MLJ Quality Hard Stops:  BMI (<40) : Estimated body mass index is 35.97 kg/m² as calculated from the following:    Height as of this encounter: 5' 9\" (1.753 m).    Weight as of this encounter: 110 kg (243 lb 9.6 oz).    Hgb ( >11):   Lab Results   Component Value Date    HGB 16.1 02/19/2024     HbA1c (<7.5) :   Lab Results   Component Value Date    HGBA1C 7.0 (H) 02/19/2024     GFR (>60) (Less then 45 = Nephrology consult):  Estimated Creatinine Clearance: 123.2 mL/min (by C-G formula based on SCr of 0.75 mg/dL).      Active Decompensated Chronic Conditions which would delay surgery  No acutely decompensated medical issues such as recent CVA, MI, new onset arrhythmia, severe aortic stenosis, CHF, uncontrolled COPD       Exercise Capacity: (if less the 4 mets consider functional assessment via cardiac stress testing or consultation)    Able to walk 2 blocks without symptoms?: Yes  Able to walk 1 flights without symptoms?: Yes    Assessment of intra and post operative respiratory, hemodynamic and thrombotic risks     Prior Anesthesia Reactions: No     Personal history of venous thromboembolic disease? No    History of steroid use > 5 mg for >2 weeks within last year? No      The patient's risk factors for cardiac complications include :  none    Balta Monson has an IN HOSPITAL cardiac risk of RCI RISK CLASS I (0 risk factors, risk of major cardiac compl. " appr. 0.5%) based on RCRI calculator    Cardiac Risk Estimation: per the Revised Cardiac Risk Index (Circ. 100:1043, 1999),        Pre-Op Data Reviewed:       Laboratory Results: I have personally reviewed the pertinent laboratory results/reports     EKG:I have personally reviewed pertinent reports.  . I personally reviewed and interpreted available tracings in the electronic medical record    Normal sinus rhythm  Normal ECG  Confirmed by Noah Agosto ([id number]) on 2/19/2024 4:22:44 PM       OLD RECORDS: reviewed old records in the chart review section if EHR on day of visit.    Previous cardiopulmonary studies within the past year:  Echocardiogram: no  Cardiac Catheterization: no  Stress Test: no      Time of visit including pre-visit chart review, visit and post-visit coordination of plan and care , review of pre-surgical lab work, preparation and time spent documenting note in electronic medical record, time spent face-to-face in physical examination answering patient questions by care team 45 minutes             [facility]

## 2024-02-21 NOTE — PRE-PROCEDURE INSTRUCTIONS
Pre-Surgery Instructions:   Medication Instructions    ascorbic acid (VITAMIN C) 500 MG tablet Hold day of surgery.    aspirin (ECOTRIN LOW STRENGTH) 81 mg EC tablet Stop taking 7 days prior to surgery.    bisoprolol-hydrochlorothiazide (ZIAC) 10-6.25 MG per tablet Take night before surgery    cholecalciferol (VITAMIN D3) 1,000 units tablet Hold day of surgery.    folic acid (FOLVITE) 1 mg tablet Hold day of surgery.    lisinopril (ZESTRIL) 5 mg tablet Take night before surgery    Multiple Vitamins-Minerals (multivitamin with minerals) tablet Hold day of surgery.    rosuvastatin (CRESTOR) 10 MG tablet Take night before surgery    Synjardy XR 12.5-1000 MG TB24 Stop taking 4 days prior to surgery. LD 2/28/24    Trulicity 1.5 MG/0.5ML injection Stop taking 7 days prior to surgery. Takes on Sundays, LD 2/25/24      Reviewed Total Joint Program    Medication instructions for day surgery reviewed. Please use only a sip of water to take your instructed medications. Avoid all over the counter vitamins, supplements and NSAIDS for one week prior to surgery per anesthesia guidelines. Tylenol is ok to take as needed.     You will receive a call one business day prior to surgery with an arrival time and hospital directions. If your surgery is scheduled on a Monday, the hospital will be calling you on the Friday prior to your surgery. If you have not heard from anyone by 8pm, please call the hospital supervisor through the hospital  at [phone]. ([place] [phone] or [place] [phone]).    Do not eat or drink anything after midnight the night before your surgery, including candy, mints, lifesavers, or chewing gum. Do not drink alcohol 24hrs before your surgery. Try not to smoke at least 24hrs before your surgery.       Follow the pre surgery showering instructions as listed in the “My Surgical Experience Booklet” or otherwise provided by your surgeon's office. Do not use a blade to shave the surgical area 1  week before surgery. It is okay to use a clean electric clippers up to 24 hours before surgery. Do not apply any lotions, creams, including makeup, cologne, deodorant, or perfumes after showering on the day of your surgery. Do not use dry shampoo, hair spray, hair gel, or any type of hair products.     No contact lenses, eye make-up, or artificial eyelashes. Remove nail polish, including gel polish, and any artificial, gel, or acrylic nails if possible. Remove all jewelry including rings and body piercing jewelry.     Wear causal clothing that is easy to take on and off. Consider your type of surgery.    Keep any valuables, jewelry, piercings at home. Please bring any specially ordered equipment (sling, braces) if indicated.    Arrange for a responsible person to drive you to and from the hospital on the day of your surgery. Please confirm the visitor policy for the day of your procedure when you receive your phone call with an arrival time.     Call the surgeon's office with any new illnesses, exposures, or additional questions prior to surgery.    Please reference your “My Surgical Experience Booklet” for additional information to prepare for your upcoming surgery.

## 2024-02-21 NOTE — ASSESSMENT & PLAN NOTE
Stable  Cont current medications as directed  Monitor post operative BP   Hold bisoprolol/hctz/lisinopril the morning of surgery

## 2024-02-22 ENCOUNTER — OFFICE VISIT (OUTPATIENT)
Age: 64
End: 2024-02-22
Payer: COMMERCIAL

## 2024-02-22 VITALS
WEIGHT: 243.6 LBS | SYSTOLIC BLOOD PRESSURE: 124 MMHG | DIASTOLIC BLOOD PRESSURE: 78 MMHG | HEIGHT: 69 IN | BODY MASS INDEX: 36.08 KG/M2

## 2024-02-22 DIAGNOSIS — M17.12 PRIMARY OSTEOARTHRITIS OF LEFT KNEE: ICD-10-CM

## 2024-02-22 DIAGNOSIS — M17.0 BILATERAL PRIMARY OSTEOARTHRITIS OF KNEE: ICD-10-CM

## 2024-02-22 DIAGNOSIS — E11.9 TYPE 2 DIABETES MELLITUS WITHOUT COMPLICATION, WITHOUT LONG-TERM CURRENT USE OF INSULIN (HCC): ICD-10-CM

## 2024-02-22 DIAGNOSIS — E66.09 CLASS 2 OBESITY DUE TO EXCESS CALORIES WITH BODY MASS INDEX (BMI) OF 35.0 TO 35.9 IN ADULT, UNSPECIFIED WHETHER SERIOUS COMORBIDITY PRESENT: ICD-10-CM

## 2024-02-22 DIAGNOSIS — I15.2 HYPERTENSION ASSOCIATED WITH TYPE 2 DIABETES MELLITUS: ICD-10-CM

## 2024-02-22 DIAGNOSIS — Z01.818 PREOP EXAM FOR INTERNAL MEDICINE: Primary | ICD-10-CM

## 2024-02-22 DIAGNOSIS — E11.59 HYPERTENSION ASSOCIATED WITH TYPE 2 DIABETES MELLITUS: ICD-10-CM

## 2024-02-22 DIAGNOSIS — E78.2 MODERATE MIXED HYPERLIPIDEMIA NOT REQUIRING STATIN THERAPY: ICD-10-CM

## 2024-02-22 PROCEDURE — 99205 OFFICE O/P NEW HI 60 MIN: CPT | Performed by: INTERNAL MEDICINE

## 2024-02-22 NOTE — PATIENT INSTRUCTIONS
Contact surgical nurse  navigator with any questions regarding preoperative plan or schedule.  Stop all over the counter supplements, herbal, naturopathic  medications for 1 week prior to surgery UNLESS prescribed by your surgeon  Hold NSAIDS (i.e. advil, alleve, motrin, ibuprofen, celebrex) minimum 7 days prior to surgery  Hold Asprin minimum 7 days prior to surgery  Recommend using Tylenol ( acetaminophen ) 500mg every eight hours during the first week post discharge in conjunction with any additional pain medicine prescribed by your surgeon  Use bowel medicines prescribed by your surgeon ( colace) daily post op during the first 1-2 weeks to avoid post operative constipation issues  Call [phone] with any post discharge concerns or medical issues  The morning of surgery take only the following medication with small sip of water: Bisoprolol hydrochlorothiazide ( unless taken evening before)  Hold Trulicity 1 week before surgery  Hold lisinopril evening before surgery

## 2024-03-01 ENCOUNTER — ANESTHESIA EVENT (OUTPATIENT)
Dept: PERIOP | Facility: HOSPITAL | Age: 64
End: 2024-03-01
Payer: COMMERCIAL

## 2024-03-04 ENCOUNTER — HOSPITAL ENCOUNTER (OUTPATIENT)
Facility: HOSPITAL | Age: 64
Setting detail: OUTPATIENT SURGERY
Discharge: HOME | End: 2024-03-04
Attending: STUDENT IN AN ORGANIZED HEALTH CARE EDUCATION/TRAINING PROGRAM | Admitting: STUDENT IN AN ORGANIZED HEALTH CARE EDUCATION/TRAINING PROGRAM
Payer: COMMERCIAL

## 2024-03-04 ENCOUNTER — APPOINTMENT (OUTPATIENT)
Dept: RADIOLOGY | Facility: HOSPITAL | Age: 64
End: 2024-03-04
Payer: COMMERCIAL

## 2024-03-04 ENCOUNTER — ANESTHESIA (OUTPATIENT)
Dept: PERIOP | Facility: HOSPITAL | Age: 64
End: 2024-03-04
Payer: COMMERCIAL

## 2024-03-04 VITALS
WEIGHT: 243.61 LBS | HEIGHT: 69 IN | HEART RATE: 57 BPM | RESPIRATION RATE: 16 BRPM | BODY MASS INDEX: 36.08 KG/M2 | OXYGEN SATURATION: 97 % | TEMPERATURE: 97.5 F | SYSTOLIC BLOOD PRESSURE: 150 MMHG | DIASTOLIC BLOOD PRESSURE: 52 MMHG

## 2024-03-04 DIAGNOSIS — M17.12 PRIMARY OSTEOARTHRITIS OF LEFT KNEE: Primary | ICD-10-CM

## 2024-03-04 LAB
ABO GROUP BLD: NORMAL
GLUCOSE SERPL-MCNC: 145 MG/DL (ref 65–140)
GLUCOSE SERPL-MCNC: 182 MG/DL (ref 65–140)
RH BLD: POSITIVE

## 2024-03-04 PROCEDURE — C1776 JOINT DEVICE (IMPLANTABLE): HCPCS | Performed by: STUDENT IN AN ORGANIZED HEALTH CARE EDUCATION/TRAINING PROGRAM

## 2024-03-04 PROCEDURE — 27446 REVISION OF KNEE JOINT: CPT | Performed by: PHYSICIAN ASSISTANT

## 2024-03-04 PROCEDURE — C1713 ANCHOR/SCREW BN/BN,TIS/BN: HCPCS | Performed by: STUDENT IN AN ORGANIZED HEALTH CARE EDUCATION/TRAINING PROGRAM

## 2024-03-04 PROCEDURE — 97163 PT EVAL HIGH COMPLEX 45 MIN: CPT | Performed by: PHYSICAL THERAPIST

## 2024-03-04 PROCEDURE — C9290 INJ, BUPIVACAINE LIPOSOME: HCPCS | Performed by: ANESTHESIOLOGY

## 2024-03-04 PROCEDURE — 97166 OT EVAL MOD COMPLEX 45 MIN: CPT

## 2024-03-04 PROCEDURE — 27446 REVISION OF KNEE JOINT: CPT | Performed by: STUDENT IN AN ORGANIZED HEALTH CARE EDUCATION/TRAINING PROGRAM

## 2024-03-04 PROCEDURE — 82948 REAGENT STRIP/BLOOD GLUCOSE: CPT

## 2024-03-04 PROCEDURE — 73560 X-RAY EXAM OF KNEE 1 OR 2: CPT

## 2024-03-04 DEVICE — SMARTSET HIGH PERFORMANCE MV MEDIUM VISCOSITY BONE CEMENT 40G
Type: IMPLANTABLE DEVICE | Site: KNEE | Status: FUNCTIONAL
Brand: SMARTSET

## 2024-03-04 DEVICE — IMPLANTABLE DEVICE
Type: IMPLANTABLE DEVICE | Site: KNEE | Status: FUNCTIONAL
Brand: OXFORD PARTIAL KNEE SYSTEM

## 2024-03-04 DEVICE — ANATOMIC MENISCAL BEARINGLEFT MEDIAL
Type: IMPLANTABLE DEVICE | Site: KNEE | Status: FUNCTIONAL
Brand: OXFORD PARTIAL KNEE SYSTEM

## 2024-03-04 DEVICE — TWIN PEG FEMORAL
Type: IMPLANTABLE DEVICE | Site: KNEE | Status: FUNCTIONAL
Brand: OXFORD PARTIAL KNEE SYSTEM

## 2024-03-04 RX ORDER — CELECOXIB 200 MG/1
200 CAPSULE ORAL 2 TIMES DAILY
Qty: 60 CAPSULE | Refills: 0 | Status: SHIPPED | OUTPATIENT
Start: 2024-03-04

## 2024-03-04 RX ORDER — SIMETHICONE 80 MG
80 TABLET,CHEWABLE ORAL 4 TIMES DAILY PRN
Status: CANCELLED | OUTPATIENT
Start: 2024-03-04

## 2024-03-04 RX ORDER — TRANEXAMIC ACID 10 MG/ML
1000 INJECTION, SOLUTION INTRAVENOUS ONCE
Status: COMPLETED | OUTPATIENT
Start: 2024-03-04 | End: 2024-03-04

## 2024-03-04 RX ORDER — SODIUM CHLORIDE 9 MG/ML
125 INJECTION, SOLUTION INTRAVENOUS CONTINUOUS
Status: DISCONTINUED | OUTPATIENT
Start: 2024-03-04 | End: 2024-03-04 | Stop reason: HOSPADM

## 2024-03-04 RX ORDER — DOCUSATE SODIUM 100 MG/1
100 CAPSULE, LIQUID FILLED ORAL 2 TIMES DAILY
Status: CANCELLED | OUTPATIENT
Start: 2024-03-04

## 2024-03-04 RX ORDER — ONDANSETRON 2 MG/ML
4 INJECTION INTRAMUSCULAR; INTRAVENOUS ONCE AS NEEDED
Status: DISCONTINUED | OUTPATIENT
Start: 2024-03-04 | End: 2024-03-04 | Stop reason: HOSPADM

## 2024-03-04 RX ORDER — MIDAZOLAM HYDROCHLORIDE 2 MG/2ML
INJECTION, SOLUTION INTRAMUSCULAR; INTRAVENOUS
Status: COMPLETED | OUTPATIENT
Start: 2024-03-04 | End: 2024-03-04

## 2024-03-04 RX ORDER — HYDROMORPHONE HCL/PF 1 MG/ML
0.5 SYRINGE (ML) INJECTION
Status: DISCONTINUED | OUTPATIENT
Start: 2024-03-04 | End: 2024-03-04 | Stop reason: HOSPADM

## 2024-03-04 RX ORDER — MEPERIDINE HYDROCHLORIDE 25 MG/ML
12.5 INJECTION INTRAMUSCULAR; INTRAVENOUS; SUBCUTANEOUS ONCE AS NEEDED
Status: DISCONTINUED | OUTPATIENT
Start: 2024-03-04 | End: 2024-03-04 | Stop reason: HOSPADM

## 2024-03-04 RX ORDER — ONDANSETRON 4 MG/1
4 TABLET, ORALLY DISINTEGRATING ORAL EVERY 6 HOURS PRN
Qty: 20 TABLET | Refills: 0 | Status: SHIPPED | OUTPATIENT
Start: 2024-03-04

## 2024-03-04 RX ORDER — PROMETHAZINE HYDROCHLORIDE 25 MG/ML
6.25 INJECTION, SOLUTION INTRAMUSCULAR; INTRAVENOUS ONCE AS NEEDED
Status: DISCONTINUED | OUTPATIENT
Start: 2024-03-04 | End: 2024-03-04 | Stop reason: HOSPADM

## 2024-03-04 RX ORDER — ASPIRIN 81 MG/1
81 TABLET ORAL 2 TIMES DAILY
Qty: 60 TABLET | Refills: 0 | Status: SHIPPED | OUTPATIENT
Start: 2024-03-04

## 2024-03-04 RX ORDER — SODIUM CHLORIDE, SODIUM LACTATE, POTASSIUM CHLORIDE, CALCIUM CHLORIDE 600; 310; 30; 20 MG/100ML; MG/100ML; MG/100ML; MG/100ML
125 INJECTION, SOLUTION INTRAVENOUS CONTINUOUS
Status: DISCONTINUED | OUTPATIENT
Start: 2024-03-04 | End: 2024-03-04 | Stop reason: HOSPADM

## 2024-03-04 RX ORDER — ACETAMINOPHEN 325 MG/1
975 TABLET ORAL ONCE
Status: COMPLETED | OUTPATIENT
Start: 2024-03-04 | End: 2024-03-04

## 2024-03-04 RX ORDER — GABAPENTIN 300 MG/1
300 CAPSULE ORAL
Status: CANCELLED | OUTPATIENT
Start: 2024-03-04

## 2024-03-04 RX ORDER — GABAPENTIN 300 MG/1
300 CAPSULE ORAL ONCE
Status: COMPLETED | OUTPATIENT
Start: 2024-03-04 | End: 2024-03-04

## 2024-03-04 RX ORDER — OXYCODONE HYDROCHLORIDE 5 MG/1
5 TABLET ORAL EVERY 4 HOURS PRN
Status: DISCONTINUED | OUTPATIENT
Start: 2024-03-04 | End: 2024-03-04 | Stop reason: HOSPADM

## 2024-03-04 RX ORDER — FENTANYL CITRATE/PF 50 MCG/ML
50 SYRINGE (ML) INJECTION
Status: DISCONTINUED | OUTPATIENT
Start: 2024-03-04 | End: 2024-03-04 | Stop reason: HOSPADM

## 2024-03-04 RX ORDER — CHLORHEXIDINE GLUCONATE 4 G/100ML
SOLUTION TOPICAL DAILY PRN
Status: DISCONTINUED | OUTPATIENT
Start: 2024-03-04 | End: 2024-03-04 | Stop reason: HOSPADM

## 2024-03-04 RX ORDER — OXYCODONE HYDROCHLORIDE 5 MG/1
5 TABLET ORAL EVERY 4 HOURS PRN
Qty: 42 TABLET | Refills: 0 | Status: SHIPPED | OUTPATIENT
Start: 2024-03-04 | End: 2024-03-14

## 2024-03-04 RX ORDER — CEFADROXIL 500 MG/1
500 CAPSULE ORAL EVERY 12 HOURS SCHEDULED
Qty: 10 CAPSULE | Refills: 0 | Status: SHIPPED | OUTPATIENT
Start: 2024-03-04 | End: 2024-03-09

## 2024-03-04 RX ORDER — PANTOPRAZOLE SODIUM 40 MG/1
40 TABLET, DELAYED RELEASE ORAL DAILY
Status: CANCELLED | OUTPATIENT
Start: 2024-03-04

## 2024-03-04 RX ORDER — PRAVASTATIN SODIUM 80 MG/1
80 TABLET ORAL
Status: CANCELLED | OUTPATIENT
Start: 2024-03-04

## 2024-03-04 RX ORDER — ACETAMINOPHEN 325 MG/1
975 TABLET ORAL EVERY 8 HOURS
Status: DISCONTINUED | OUTPATIENT
Start: 2024-03-04 | End: 2024-03-04 | Stop reason: HOSPADM

## 2024-03-04 RX ORDER — FENTANYL CITRATE 50 UG/ML
INJECTION, SOLUTION INTRAMUSCULAR; INTRAVENOUS
Status: COMPLETED | OUTPATIENT
Start: 2024-03-04 | End: 2024-03-04

## 2024-03-04 RX ORDER — BUPIVACAINE HYDROCHLORIDE 7.5 MG/ML
INJECTION, SOLUTION INTRASPINAL AS NEEDED
Status: DISCONTINUED | OUTPATIENT
Start: 2024-03-04 | End: 2024-03-04

## 2024-03-04 RX ORDER — SODIUM CHLORIDE, SODIUM LACTATE, POTASSIUM CHLORIDE, CALCIUM CHLORIDE 600; 310; 30; 20 MG/100ML; MG/100ML; MG/100ML; MG/100ML
100 INJECTION, SOLUTION INTRAVENOUS CONTINUOUS
Status: DISCONTINUED | OUTPATIENT
Start: 2024-03-04 | End: 2024-03-04 | Stop reason: HOSPADM

## 2024-03-04 RX ORDER — MAGNESIUM HYDROXIDE 1200 MG/15ML
LIQUID ORAL AS NEEDED
Status: DISCONTINUED | OUTPATIENT
Start: 2024-03-04 | End: 2024-03-04 | Stop reason: HOSPADM

## 2024-03-04 RX ORDER — KETOROLAC TROMETHAMINE 30 MG/ML
INJECTION, SOLUTION INTRAMUSCULAR; INTRAVENOUS AS NEEDED
Status: DISCONTINUED | OUTPATIENT
Start: 2024-03-04 | End: 2024-03-04 | Stop reason: HOSPADM

## 2024-03-04 RX ORDER — AMOXICILLIN 250 MG
1 CAPSULE ORAL DAILY
Qty: 30 TABLET | Refills: 0 | Status: SHIPPED | OUTPATIENT
Start: 2024-03-04

## 2024-03-04 RX ORDER — PROPOFOL 10 MG/ML
INJECTION, EMULSION INTRAVENOUS CONTINUOUS PRN
Status: DISCONTINUED | OUTPATIENT
Start: 2024-03-04 | End: 2024-03-04

## 2024-03-04 RX ORDER — CALCIUM CARBONATE 500 MG/1
1000 TABLET, CHEWABLE ORAL DAILY PRN
Status: CANCELLED | OUTPATIENT
Start: 2024-03-04

## 2024-03-04 RX ORDER — CHLORHEXIDINE GLUCONATE ORAL RINSE 1.2 MG/ML
15 SOLUTION DENTAL ONCE
Status: COMPLETED | OUTPATIENT
Start: 2024-03-04 | End: 2024-03-04

## 2024-03-04 RX ORDER — ONDANSETRON 2 MG/ML
INJECTION INTRAMUSCULAR; INTRAVENOUS AS NEEDED
Status: DISCONTINUED | OUTPATIENT
Start: 2024-03-04 | End: 2024-03-04

## 2024-03-04 RX ORDER — SENNOSIDES 8.6 MG
1 TABLET ORAL DAILY
Status: CANCELLED | OUTPATIENT
Start: 2024-03-04

## 2024-03-04 RX ORDER — BUPIVACAINE HYDROCHLORIDE 2.5 MG/ML
INJECTION, SOLUTION EPIDURAL; INFILTRATION; INTRACAUDAL AS NEEDED
Status: DISCONTINUED | OUTPATIENT
Start: 2024-03-04 | End: 2024-03-04 | Stop reason: HOSPADM

## 2024-03-04 RX ORDER — OXYCODONE HYDROCHLORIDE 10 MG/1
10 TABLET ORAL EVERY 4 HOURS PRN
Status: DISCONTINUED | OUTPATIENT
Start: 2024-03-04 | End: 2024-03-04 | Stop reason: HOSPADM

## 2024-03-04 RX ORDER — FOLIC ACID 1 MG/1
1 TABLET ORAL DAILY
Status: CANCELLED | OUTPATIENT
Start: 2024-03-04

## 2024-03-04 RX ORDER — CEFAZOLIN SODIUM 2 G/50ML
2000 SOLUTION INTRAVENOUS EVERY 8 HOURS
Status: CANCELLED | OUTPATIENT
Start: 2024-03-04 | End: 2024-03-05

## 2024-03-04 RX ORDER — BUPIVACAINE HYDROCHLORIDE 5 MG/ML
INJECTION, SOLUTION EPIDURAL; INTRACAUDAL
Status: COMPLETED | OUTPATIENT
Start: 2024-03-04 | End: 2024-03-04

## 2024-03-04 RX ORDER — ACETAMINOPHEN 500 MG
1000 TABLET ORAL EVERY 8 HOURS
Qty: 60 TABLET | Refills: 0 | Status: SHIPPED | OUTPATIENT
Start: 2024-03-04

## 2024-03-04 RX ORDER — ASCORBIC ACID 500 MG
500 TABLET ORAL 2 TIMES DAILY
Status: CANCELLED | OUTPATIENT
Start: 2024-03-04

## 2024-03-04 RX ORDER — HYDROMORPHONE HCL/PF 1 MG/ML
SYRINGE (ML) INJECTION AS NEEDED
Status: DISCONTINUED | OUTPATIENT
Start: 2024-03-04 | End: 2024-03-04

## 2024-03-04 RX ORDER — ONDANSETRON 2 MG/ML
4 INJECTION INTRAMUSCULAR; INTRAVENOUS EVERY 6 HOURS PRN
Status: CANCELLED | OUTPATIENT
Start: 2024-03-04

## 2024-03-04 RX ORDER — CEFAZOLIN SODIUM 2 G/50ML
2000 SOLUTION INTRAVENOUS ONCE
Status: COMPLETED | OUTPATIENT
Start: 2024-03-04 | End: 2024-03-04

## 2024-03-04 RX ORDER — ACETAMINOPHEN 325 MG/1
650 TABLET ORAL EVERY 4 HOURS PRN
Status: DISCONTINUED | OUTPATIENT
Start: 2024-03-04 | End: 2024-03-04 | Stop reason: HOSPADM

## 2024-03-04 RX ADMIN — PROPOFOL 100 MCG/KG/MIN: 10 INJECTION, EMULSION INTRAVENOUS at 07:25

## 2024-03-04 RX ADMIN — ACETAMINOPHEN 325MG 975 MG: 325 TABLET ORAL at 05:38

## 2024-03-04 RX ADMIN — CEFAZOLIN SODIUM 2000 MG: 2 SOLUTION INTRAVENOUS at 07:17

## 2024-03-04 RX ADMIN — FENTANYL CITRATE 100 MCG: 50 INJECTION INTRAMUSCULAR; INTRAVENOUS at 06:57

## 2024-03-04 RX ADMIN — BUPIVACAINE HYDROCHLORIDE IN DEXTROSE 1.5 ML: 7.5 INJECTION, SOLUTION SUBARACHNOID at 07:22

## 2024-03-04 RX ADMIN — OXYCODONE 5 MG: 5 TABLET ORAL at 14:59

## 2024-03-04 RX ADMIN — CHLORHEXIDINE GLUCONATE 15 ML: 1.2 RINSE ORAL at 05:38

## 2024-03-04 RX ADMIN — BUPIVACAINE 10 ML: 13.3 INJECTION, SUSPENSION, LIPOSOMAL INFILTRATION at 06:57

## 2024-03-04 RX ADMIN — SODIUM CHLORIDE, SODIUM LACTATE, POTASSIUM CHLORIDE, AND CALCIUM CHLORIDE 125 ML/HR: .6; .31; .03; .02 INJECTION, SOLUTION INTRAVENOUS at 06:06

## 2024-03-04 RX ADMIN — BUPIVACAINE HYDROCHLORIDE 10 ML: 5 INJECTION, SOLUTION EPIDURAL; INTRACAUDAL; PERINEURAL at 06:57

## 2024-03-04 RX ADMIN — PROPOFOL 30 MG: 10 INJECTION, EMULSION INTRAVENOUS at 07:26

## 2024-03-04 RX ADMIN — TRANEXAMIC ACID 1000 MG: 10 INJECTION, SOLUTION INTRAVENOUS at 07:23

## 2024-03-04 RX ADMIN — ACETAMINOPHEN 325MG 650 MG: 325 TABLET ORAL at 09:53

## 2024-03-04 RX ADMIN — ONDANSETRON 4 MG: 2 INJECTION INTRAMUSCULAR; INTRAVENOUS at 07:23

## 2024-03-04 RX ADMIN — SODIUM CHLORIDE, SODIUM LACTATE, POTASSIUM CHLORIDE, AND CALCIUM CHLORIDE: .6; .31; .03; .02 INJECTION, SOLUTION INTRAVENOUS at 07:39

## 2024-03-04 RX ADMIN — MIDAZOLAM 2 MG: 1 INJECTION INTRAMUSCULAR; INTRAVENOUS at 06:57

## 2024-03-04 RX ADMIN — HYDROMORPHONE HYDROCHLORIDE 0.5 MG: 1 INJECTION, SOLUTION INTRAMUSCULAR; INTRAVENOUS; SUBCUTANEOUS at 08:06

## 2024-03-04 RX ADMIN — GABAPENTIN 300 MG: 300 CAPSULE ORAL at 05:38

## 2024-03-04 RX ADMIN — OXYCODONE 5 MG: 5 TABLET ORAL at 09:53

## 2024-03-04 RX ADMIN — PROPOFOL 30 MG: 10 INJECTION, EMULSION INTRAVENOUS at 07:20

## 2024-03-04 NOTE — ANESTHESIA POSTPROCEDURE EVALUATION
Post-Op Assessment Note    CV Status:  Stable  Pain Score: 1    Pain management: adequate       Mental Status:  Alert and awake   Hydration Status:  Euvolemic   PONV Controlled:  Controlled   Airway Patency:  Patent     Post Op Vitals Reviewed: Yes    No anethesia notable event occurred.    Staff: Anesthesiologist               /59 (03/04/24 0916)    Temp      Pulse 55 (03/04/24 0916)   Resp 17 (03/04/24 0916)    SpO2 93 % (03/04/24 0916)

## 2024-03-04 NOTE — OP NOTE
OPERATIVE REPORT  PATIENT NAME: Balta Monson  : 1960  MRN: [id number]  Pt Location:  [facility] OR ROOM 02    Surgery Date: 3/4/2024    Surgeons and Role:     * Kyle Farrar,  - Primary      * Ameena Herrera PA-C - Assisting     Preop Diagnosis:  Bilateral primary osteoarthritis of knee [M17.0]    Post-Op Diagnosis Codes:     * Bilateral primary osteoarthritis of knee [M17.0]    Procedure(s):  Left - ARTHROPLASTY KNEE MEDIAL UNICOMPARTMENTAL    Specimens:  * No specimens in log *    Estimated Blood Loss:   50 mL    Drains:  * No LDAs found *    Anesthesia Type:   Spinal      Operative Indications:  Bilateral primary osteoarthritis of knee [M17.0]  62M with left knee pain 2/2 severe medial compartment osteoarthritis. Xrays of the left knee reviewed showing severe arthritic changes with decreased joint space and osteophyte formation localized to the medial compartment, as well as varus alignment.  On physical exam he has maintained good motion, but with pain localized over the medial joint line with no significant effusion. He has failed extensive non-operative management. The patient has elected to proceed with Left knee medial UKA vs TKA. Risks and benefits of surgery to include but not limited to bleeding, infection, damage to surrounding structures, hardware failure, instability, fracture, dislocation, need for further surgery, continued pain, stiffness, blood clots, stroke, and heart attack was discussed with the patient.     Operative Findings:  Grade IV changes MFC and MTP  Posterior root meniscal tear with extrusion  Grade II/III changes medial aspect of trochlea and medial facet of patella  No wear lateral trochlea or lateral facet of patella  No discernable wear lateral compartment with intact meniscus  Intact ACL/PCL    Implant Name Type Inv. Item Serial No.  Lot No. LRB No. Used Action   CEMENT BONE SMART SET GRAY MED VISC - [id number]  CEMENT BONE SMART SET GRAY MED VISC  [facility] [id number]  Left 1 Implanted   COMPONENT FEMORAL CMNT TWN PEG LRG - [id number]  COMPONENT FEMORAL CMNT TWN PEG LRG  [employer] [id number] Left 1 Implanted   INSERT TIBIAL ARTC SZ B LM - [id number]  INSERT TIBIAL ARTC SZ B LM  [employer] [id number] Left 1 Implanted   INSERT TIBIAL BRNG LRG SZ 5 LT [place] - [id number]  INSERT TIBIAL BRNG LRG SZ 5 LT [place]  [employer] [id number] Left 1 Implanted       Complications:   None    Knee Technique: Suture (direct) Repair  Knee Approach: Medial Parapatellar    Procedure and Technique:  Patient was seen in the preoperative holding area.  Informed consent was confirmed and all questions were answered. Operative site was confirmed and marked. Patient was taken to the operating room and transferred to the operating room table. Anesthesia was performed. The patient was then placed supine and all bony prominences were well-padded. Left lower extremity was prepped and draped in usual sterile fashion with chlorhexidine scrub.  Patient was given perioperative antibiotics prior to incision and SCDs were placed on the non-operative leg.  A formal time-out was performed identifying the patient and confirmed operative site.  The knee was exsanguinated and a pneumatic tourniquet was inflated at 250 mmHg.  A slightly medial midline incision was performed from 1 fingerbreadth above the patella to the tibial tubercle.  This incision was carried down through skin and subcutaneous tissues to the level of the extensor mechanism.  A small medial skin flap was created.  A mini mid-vastus approach was performed into the knee being careful to avoid the patellar tendon and intact cartilage. The anterior horn of the medial meniscus was released.  The medial peel was performed to the mid coronal line. At this time the lateral and patellofemoral joints were carefully inspected for wear and found to have no significant wear precluding unicompartmental arthroplasty. Peripheral osteophytes were removed at this time from the  medial compartment. The femur was sized to a large per the patient's demographics and intraoperative measurement. The extramedullary tibial guide was placed perpendicular to the mechanical axis and with appropriate slope. The size 4 G clamp was used to secure the cutting guide. Retractors were placed to protect the ACL and MCL. The tibia was cut with sagittal saw being careful to avoid the ACL and reciprocating saw being careful to avoid the MCL. The tibial cut was loosened with a straight osteotome and removed. The tibial was inspected and confirmed to have isolated anteromedial wear with intact posterior cartilage. The tibia was sized to a B with no posterior or medial overhang. At this time the femoral canal drill was used followed by opening awl and IM alfreda. The AP axis of the [facility] was marked with a surgical marker. The large size femoral cutting guide was placed and attached to the IM alfreda. The alignment was confirmed to be along the AP axis. The proximal and distal holes were drilled. The guide was removed and the size zero spiguet was placed. The distal femur was reamed. The trial was placed. The flexion gap was appropriate with a size 5. The extension gap was then assessed and found to be a 2. At the time the 3 spiguet was placed and the distal femur was reamed again. The nibler was then used to remove peripheral bone. The flexion and extension gaps were then assessed and found to be symmetric with a size 5 insert. At this time the final femoral prep with the rhino reamer and posterior osteotome was completed. At this time the tibia was prepared the appropriate size baseplate was pinned in place. The toothbrush blade was used to cut the trough. This was then cleaned of any residual bone. At this time the knee was again trialed with the above implants and found to have excellent stability with impingement of the bearing along the lateral tibial tray, no bearing spit-out and appropriate alignment. At this time  all trials were removed and the knee was copiously irrigated. The distal femur was prepped with the small finishing drill for cement interdigitation. The bone surfaces were dried. The tibial was cemented in place extruding the cement anteriorly. The tibia was then cleared of all excess cement. The femur was then cemented into place. The was then held at 45 degrees with axial pressure with the appropriate sized spacer block in place until cement was cured. The knee was then trialed with the above bearing and found to have excellent stability. The real size 5 bearing was implanted and the knee was taken through ROM. The tourniquet was released at 41 minutes and all bleeders were coagulated.  The knee was irrigated with the remainder of the 3 L of normal saline solution.  The joint local was injected in the posterior capsule and around the tissues of the knee.  The knee was taken through a final range of motion and found to have excellent stability and patellar tracking.  All instrument and sponge counts were correct x2.  The arthrotomy was closed with #1 Stratafix suture.  Subcutaneous tissues were closed with 2-0 Vicryl.  The skin was closed with 3-0 Stratafix followed by Dermabond.  A sterile Mepilex was placed along with a thigh foot Ace wrap.  Patient was awoken from anesthesia and taken to recovery room in stable condition.     I was present for the entire procedure., A qualified resident physician was not available., and A physician assistant was required during the procedure for retraction, tissue handling, dissection and suturing.    Patient Disposition:  PACU     63M s/p left medial UKA 3/4  - multi-modal pain control  - ancef pre-op and duricef x 5 days as planned same day discharge   - DVT ppx: aspirin 81 mg BID x 4 weeks  - PT/OT  - WBAT  - ROM as tolerated  - f/u 10-14 days     SIGNATURE: Kyle Farrar DO  DATE: March 4, 2024  TIME: 8:39 AM

## 2024-03-04 NOTE — ANESTHESIA PROCEDURE NOTES
Spinal Block    Patient location during procedure: OR  Start time: 3/4/2024 7:22 AM  Staffing  Performed by: Deshawn Cummins CRNA  Authorized by: Phan Mcmullen DO    Preanesthetic Checklist  Completed: patient identified, IV checked, site marked, risks and benefits discussed, surgical consent, monitors and equipment checked, pre-op evaluation and timeout performed  Spinal Block  Patient position: sitting  Prep: Betadine  Patient monitoring: heart rate, cardiac monitor, continuous pulse ox and frequent blood pressure checks  Approach: midline  Location: L3-4  Injection technique: single-shot  Needle  Needle type: pencil-tip   Needle gauge: 24 G  Needle length: 5 cm  Assessment  Injection Assessment:  no paresthesia on injection, negative aspiration for heme and positive aspiration for clear CSF.  Post-procedure:  site cleaned

## 2024-03-04 NOTE — PHYSICAL THERAPY NOTE
PT EVALUATION    Pt. Name: Balta Monson  Pt. Age: 63 y.o.  MRN: [id number]  LENGTH OF STAY: 0    Patient Active Problem List   Diagnosis    Class 2 obesity due to excess calories with body mass index (BMI) of 35.0 to 35.9 in adult    COPD, severity to be determined ([facility])    CPAP (continuous positive airway pressure) dependence    Dyslipidemia, goal to be determined    Hyperlipemia    Hypertension associated with type 2 diabetes mellitus     Microalbuminuria due to type 2 diabetes mellitus     Sleep apnea    Primary osteoarthritis of left knee    Type 2 diabetes mellitus without complication, without long-term current use of insulin ([facility])       Admitting Diagnoses:   Bilateral primary osteoarthritis of knee [M17.0]    Past Medical History:   Diagnosis Date    COPD (chronic obstructive pulmonary disease) ([facility])     CPAP (continuous positive airway pressure) dependence     Not using at present    Diabetes mellitus ([facility])     Hypertension     Sleep apnea        Past Surgical History:   Procedure Laterality Date    APPENDECTOMY      CATARACT EXTRACTION      KNEE ARTHROSCOPY      SOFT TISSUE CYST EXCISION      groin       Imaging Studies:  XR knee left 1 or 2 views    (Results Pending)   \   03/04/24 1352   PT Last Visit   PT Visit Date 03/04/24   Note Type   Note type Evaluation   Pain Assessment   Pain Assessment Tool 0-10   Pain Score 2   Pain Location/Orientation Orientation: Left;Location: Knee   Hospital Pain Intervention(s) Repositioned;Ambulation/increased activity;Elevated;Emotional support;Rest   Restrictions/Precautions   Weight Bearing Precautions Per Order Yes   LLE Weight Bearing Per Order WBAT   Other Precautions WBS;Multiple lines;Fall Risk;Pain   Home Living   Type of Home House   Home Layout One level;Performs ADLs on one level;Able to live on main level with bedroom/bathroom;Stairs to enter with rails  (4 MAXI with hand rail)   Bathroom Shower/Tub Walk-in shower   Bathroom Toilet Raised   Bathroom  Equipment   (denies DME)   Home Equipment Walker;Cane   Prior Function   Level of [place] Independent with ADLs;Independent with functional mobility;Independent with IADLS  (w/o AD)   Lives With Spouse   Receives Help From Family   IADLs Independent with driving;Independent with meal prep;Independent with medication management   Falls in the last 6 months 0   Vocational Full time employment   Comments PTA, pt independent with ADLs, IADLs, and functional mobility without AD. (+) . (+) full time employment.   General   Family/Caregiver Present Yes   Cognition   Overall Cognitive Status WFL   Arousal/Participation Alert   Orientation Level Oriented X4   Following Commands Follows all commands and directions without difficulty   Comments Cooperative and motivated   Subjective   Subjective I feel good   RUE Assessment   RUE Assessment   (refer to OT)   LUE Assessment   LUE Assessment   (refer to OT)   RLE Assessment   RLE Assessment WFL  (5/5 grossly)   LLE Assessment   LLE Assessment X   LLE Overall AROM   L Knee Flexion able to achieve 90° EOB   L Knee Extension -5°   Strength LLE   L Hip Flexion 3+/5   L Knee Flexion 3/5   L Knee Extension 3/5   L Ankle Dorsiflexion 4+/5   L Ankle Plantar Flexion 4+/5   Light Touch   RLE Light Touch Grossly intact   LLE Light Touch Grossly intact   Bed Mobility   Supine to Sit Unable to assess   Sit to Supine Unable to assess   Additional Comments Pt greeted EOB. BP /66.   Transfers   Sit to Stand 6  Modified independent   Additional items Bedrails  (w/ RW)   Stand to Sit 6  Modified independent   Additional items Armrests  (w/ RW)   Additional Comments demonstrated proper technique and safety; BP standing 174/71   Ambulation/Elevation   Gait pattern Antalgic;Decreased foot clearance;Decreased L stance;Short stride;Step to   Gait Assistance 5  Supervision   Additional items Verbal cues   Assistive Device Rolling walker   Distance 80'x1; 80'x1   Stair Management  Assistance 5  Supervision   Additional items Verbal cues   Stair Management Technique Two rails;Step to pattern;Foreward;Nonreciprocal   Number of Stairs 5   Ambulation/Elevation Additional Comments cues for proper stair navigation   Balance   Static Sitting Normal   Dynamic Sitting Good   Static Standing Fair  (w/ RW)   Dynamic Standing Fair -  (w/ RW)   Ambulatory Fair -  (w/ RW)   Activity Tolerance   Activity Tolerance Patient tolerated treatment well   Medical Staff Made Aware OT Temo   Nurse Made Aware RN yes   Assessment   Prognosis Excellent   Problem List Decreased strength;Decreased range of motion;Decreased endurance;Impaired balance;Decreased mobility;Orthopedic restrictions;Pain   Assessment Pt. 63 y.o.male presents for elective surgery. Past medical hx includes COPD, DM, HTN. Pt admitted for <principal problem not specified> w/ Bilateral primary osteoarthritis of knee (M17.0). S/p  L TKA medial unicompartmental on 3/4/24 . Pt referred to PT for functional mobility evaluation & D/C planning w/ orders of up w/ assistance. WBAT B/L LE. PTA, pt reports being I w/o AD. Pain 2/10 in L knee. During evaluation, deficits included dec mobility, balance, ambulation. Required mod I for sit<>stand, S for ambulation and stair navigation. Pt was able to ambulate 80' with RW and S in unit. Step to gait with no gross LOB noted. Educated pt on proper stair navigation. Able to perform 5 steps with nonreciprocal gait, bilateral hand rails and S. Following stairs, pt was able to ambulate an additional 80' with RW and S in unit. Continued step to gait with inc in gait speed. Vitals stable throughout session. Educated pt on HEP and provided handout. All questions and concerns addressed at this time. Pt demonstrated dec endurance and tolerance to activity. Denies reports of dizziness or SOB t/o session. Pt was educated on fall precautions and reinforced w/ good understanding. Pt would benefit from continued PT to address  deficits as defined above and maximize level of independence with functional mobility and safety. Based on pt presentation and impaired function, pt would benefit from level III, (minimum resource intensity) at D/C. The patient's -[facility] Basic Mobility Inpatient Short Form Raw Score is 23. A Raw score of greater than 16 suggests the patient may benefit from discharge to home. Please also refer to the recommendation of the Physical Therapist for safe discharge planning. From PT standpoint, pt cleared functionally for D/C when medically appropriate. CM to follow. Nsg staff to continue to mobilized pt (OOB in chair for all meals & ambulate in room/unit) as tolerated to prevent further decline in function. Nsg notified. Co-eval performed to complete this PT evaluation for the pts best interest given pts medical complexity and functional level.   Barriers to Discharge None   Goals   Patient Goals to go home   STG Expiration Date 03/18/24   Short Term Goal #1 1) Inc overall LE strength by 1/2 MMT grade to improve functional mobility; 2) Pt will demonstrate improved bed mobility independently to dec caregiver burden; 3) Pt will demonstrate improved transfers w/ independently for inc safety; 4) Pt will be able to amb w/ mod I >150' w/ RW for household distances to inc safety and dec caregiver burden; 5) Pt will be able to navigate stairs with mod I to dec caregiver burden and inc safety with functional mobility; 6) Improve general balance by 1 grade to inc safety; 7) PT for ongoing patient and caregiver education   PT Treatment Day 0   Plan   Treatment/Interventions Functional transfer training;LE strengthening/ROM;Elevations;Therapeutic exercise;Endurance training;Patient/family training;Bed mobility;Gait training;Spoke to nursing;OT   PT Frequency Twice a day   Discharge Recommendation   Rehab Resource Intensity Level, PT III (Minimum Resource Intensity)   Equipment Recommended Walker  (pt owns)   -[facility] Basic Mobility  Inpatient   Turning in Flat Bed Without Bedrails 4   Lying on Back to Sitting on Edge of Flat Bed Without Bedrails 4   Moving Bed to Chair 4   Standing Up From Chair Using Arms 4   Walk in Room 4   Climb 3-5 Stairs With Railing 3   Basic Mobility Inpatient Raw Score 23   Basic Mobility Standardized Score 50.88   Highest Level Of Mobility   -HLM Goal 7: Walk 25 feet or more   JH-HLM Achieved 7: Walk 25 feet or more   End of Consult   Patient Position at End of Consult Bedside chair;All needs within reach   End of Consult Comments Pt in stable condition at end of session. BP in chair 139/75.   Hx/personal factors: co-morbidities, inaccessible home, use of AD, pain, WB restrictions, fall risk, and obesity, coping styles, social background, past experience, behavior pattern  Examination: dec mobility, dec balance, dec endurance, dec amb, risk for falls, pain, assessed body system, balance, endurance, amb, D/C disposition & fall risk, WB restrictions, impairements in locomotion, musculoskeletal, balance, endurance, posture, coordination, assessed cognition, impairments in systems including multiple body structures involved; musculoskeletal (ROM, strength, posture, BMI), neuromuscular (balance,locomotion, gait, transfers, motor control and learning, sensation), joint integrity, integumentary (skin integrity, presence of scars or wounds), cardiopulmonary (vitals, edema); activity limitations (difficulties executing an action); participation restrictions (problems associated w involvement in life situations)  Clinical: unpredictable (ongoing medical status, risk for falls, POD #0, and pain mgt)  Complexity: high      Serina Batista, PT

## 2024-03-04 NOTE — ANESTHESIA PROCEDURE NOTES
Peripheral Block    Patient location during procedure: holding area  Start time: 3/4/2024 6:52 AM  Reason for block: at surgeon's request and post-op pain management  Staffing  Performed by: Phan Mcmullen DO  Authorized by: Phan Mcmullen DO    Preanesthetic Checklist  Completed: patient identified, IV checked, site marked, risks and benefits discussed, surgical consent, monitors and equipment checked, pre-op evaluation and timeout performed  Peripheral Block  Patient position: supine  Prep: ChloraPrep  Patient monitoring: continuous pulse oximetry, frequent blood pressure checks and heart rate  Block type: Adductor Canal  Laterality: left  Injection technique: single-shot  Procedures: ultrasound guided, Ultrasound guidance required for the procedure to increase accuracy and safety of medication placement and decrease risk of complications.  Ultrasound permanent image savedbupivacaine liposomal (EXPAREL) 1.3 % injection 20 mL - Perineural   10 mL - 3/4/2024 6:57:00 AM  bupivacaine (PF) (MARCAINE) 0.5 % injection 20 mL - Perineural   10 mL - 3/4/2024 6:57:00 AM  fentanyl citrate (PF) 100 MCG/2ML 50 mcg - Intravenous   100 mcg - 3/4/2024 6:57:00 AM  midazolam (VERSED) injection 0.5 mg - Intravenous   2 mg - 3/4/2024 6:57:00 AM  Needle  Needle type: Stimuplex   Needle gauge: 20 G  Needle length: 4 in  Needle localization: ultrasound guidance  Assessment  Injection assessment: frequent aspiration, injected with ease, negative aspiration, no paresthesia on injection, incremental injection, needle tip visualized at all times, negative for heart rate change and no symptoms of intraneural/intravenous injection  Paresthesia pain: none  Post-procedure:  site cleaned  patient tolerated the procedure well with no immediate complications

## 2024-03-04 NOTE — INTERVAL H&P NOTE
H&P reviewed. After examining the patient I find no changes in the patients condition since the H&P had been written. Plan for left medial UKA vs TKA.

## 2024-03-04 NOTE — ANESTHESIA PREPROCEDURE EVALUATION
Procedure:  ARTHROPLASTY KNEE UNICOMPARTMENTAL vs TKA (Left: Knee)    Relevant Problems   ANESTHESIA (within normal limits)      CARDIO   (+) Hyperlipemia   (+) Hypertension associated with type 2 diabetes mellitus       ENDO   (+) Type 2 diabetes mellitus without complication, without long-term current use of insulin (HCC)      GI/HEPATIC (within normal limits)      /RENAL   (+) Microalbuminuria due to type 2 diabetes mellitus       HEMATOLOGY (within normal limits)      MUSCULOSKELETAL   (+) Primary osteoarthritis of left knee      PULMONARY   (+) COPD, severity to be determined (HCC) (Patient denies COPD history)   (+) Sleep apnea (Does not use CPAP)        Physical Exam    Airway    Mallampati score: II  TM Distance: <3 FB  Neck ROM: full     Dental        Cardiovascular  Cardiovascular exam normal    Pulmonary  Pulmonary exam normal     Other Findings        Anesthesia Plan  ASA Score- 2     Anesthesia Type- spinal with ASA Monitors.         Additional Monitors:     Airway Plan:     Comment: Adductor block for postop pain control  Discussed GA backup. Patient is expecting same day discharge. .       Plan Factors-    Chart reviewed.   Existing labs reviewed. Patient summary reviewed.                  Induction- intravenous.    Postoperative Plan-     Informed Consent- Anesthetic plan and risks discussed with patient.

## 2024-03-04 NOTE — PLAN OF CARE
Problem: PHYSICAL THERAPY ADULT  Goal: Performs mobility at highest level of function for planned discharge setting.  See evaluation for individualized goals.  Description: Treatment/Interventions: Functional transfer training, LE strengthening/ROM, Elevations, Therapeutic exercise, Endurance training, Patient/family training, Bed mobility, Gait training, Spoke to nursing, OT  Equipment Recommended: Walker (pt owns)       See flowsheet documentation for full assessment, interventions and recommendations.  Note: Prognosis: Excellent  Problem List: Decreased strength, Decreased range of motion, Decreased endurance, Impaired balance, Decreased mobility, Orthopedic restrictions, Pain  Assessment: Pt. 63 y.o.male presents for elective surgery. Past medical hx includes COPD, DM, HTN. Pt admitted for <principal problem not specified> w/ Bilateral primary osteoarthritis of knee (M17.0). S/p  L TKA medial unicompartmental on 3/4/24 . Pt referred to PT for functional mobility evaluation & D/C planning w/ orders of up w/ assistance. WBAT B/L LE. PTA, pt reports being I w/o AD. Pain 2/10 in L knee. During evaluation, deficits included dec mobility, balance, ambulation. Required mod I for sit<>stand, S for ambulation and stair navigation. Pt was able to ambulate 80' with RW and S in unit. Step to gait with no gross LOB noted. Educated pt on proper stair navigation. Able to perform 5 steps with nonreciprocal gait, bilateral hand rails and S. Following stairs, pt was able to ambulate an additional 80' with RW and S in unit. Continued step to gait with inc in gait speed. Vitals stable throughout session. Educated pt on HEP and provided handout. All questions and concerns addressed at this time. Pt demonstrated dec endurance and tolerance to activity. Denies reports of dizziness or SOB t/o session. Pt was educated on fall precautions and reinforced w/ good understanding. Pt would benefit from continued PT to address deficits as  defined above and maximize level of independence with functional mobility and safety. Based on pt presentation and impaired function, pt would benefit from level III, (minimum resource intensity) at D/C. The patient's AM-PAC Basic Mobility Inpatient Short Form Raw Score is 23. A Raw score of greater than 16 suggests the patient may benefit from discharge to home. Please also refer to the recommendation of the Physical Therapist for safe discharge planning. From PT standpoint, pt cleared functionally for D/C when medically appropriate. CM to follow. Nsg staff to continue to mobilized pt (OOB in chair for all meals & ambulate in room/unit) as tolerated to prevent further decline in function. Nsg notified. Co-eval performed to complete this PT evaluation for the pts best interest given pts medical complexity and functional level.  Barriers to Discharge: None     Rehab Resource Intensity Level, PT: III (Minimum Resource Intensity)    See flowsheet documentation for full assessment.

## 2024-03-04 NOTE — OCCUPATIONAL THERAPY NOTE
"    Occupational Therapy Evaluation     Patient Name: Balta Monson  Today's Date: 3/4/2024  Problem List  Active Problems:  There are no active Hospital Problems.    Past Medical History  Past Medical History:   Diagnosis Date    COPD (chronic obstructive pulmonary disease) ([facility])     CPAP (continuous positive airway pressure) dependence     Not using at present    Diabetes mellitus (HCC)     Hypertension     Sleep apnea      Past Surgical History  Past Surgical History:   Procedure Laterality Date    APPENDECTOMY      CATARACT EXTRACTION      KNEE ARTHROSCOPY      SOFT TISSUE CYST EXCISION      groin           03/04/24 1320   Note Type   Note type Evaluation   Pain Assessment   Pain Assessment Tool 0-10   Pain Score 2   Pain Location/Orientation Orientation: Left;Location: Knee   Restrictions/Precautions   Weight Bearing Precautions Per Order Yes   LLE Weight Bearing Per Order WBAT   Other Precautions Fall Risk;Pain   Home Living   Type of Home House   Home Layout One level  (4 tucker with railing)   Bathroom Shower/Tub Walk-in shower   Bathroom Toilet Raised   Home Equipment Walker;Cane   Prior Function   Level of [place] Independent with ADLs   Lives With Spouse   Falls in the last 6 months 0   Lifestyle   Autonomy PTA pt states independence with all aspects of his ADLs, transfers, ambulation--w/o device; neg falls, neg home alone, +   Reciprocal Relationships supportive wife   Service to Others works for a [employer]   [employer] owns dogs   Subjective   Subjective \"I don't feel too bad.\"   ADL   Where Assessed Edge of bed   Eating Assistance 6  Modified independent   Grooming Assistance 6  Modified Independent   UB Bathing Assistance 5  Supervision/Setup   LB Bathing Assistance 5  Supervision/Setup   UB Dressing Assistance 5  Supervision/Setup   LB Dressing Assistance 5  Supervision/Setup   Toileting Assistance  5  Supervision/Setup   Transfers   Sit to Stand 6  Modified independent "   Additional items Increased time required;Verbal cues   Stand to Sit 6  Modified independent   Additional items Increased time required;Verbal cues   Functional Mobility   Functional Mobility 5  Supervision   Additional items Rolling walker   Balance   Static Sitting Normal   Dynamic Sitting Good   Static Standing Fair +   Dynamic Standing Fair   Activity Tolerance   Activity Tolerance Patient tolerated treatment well   Medical Staff Made Aware nsg, P.T.   RUE Assessment   RUE Assessment WFL   RUE Strength   RUE Overall Strength Within Functional Limits - strength 5/5   LUE Assessment   LUE Assessment WFL   LUE Strength   LUE Overall Strength Within Functional Limits - strength 5/5   Hand Function   Gross Motor Coordination Functional   Sensation   Light Touch No apparent deficits   Proprioception   Proprioception No apparent deficits   Vision-Basic Assessment   Current Vision   (no glasses noted)   Vision - Complex Assessment   Visual Fields   (able to scan visual fields)   Psychosocial   Psychosocial (WDL) WDL   Perception   Inattention/Neglect Appears intact   Cognition   Overall Cognitive Status WFL   Arousal/Participation Alert   Attention Within functional limits   Orientation Level Oriented X4   Memory Within functional limits   Following Commands Follows all commands and directions without difficulty   Assessment   Limitation Decreased endurance;Decreased high-level ADLs   Prognosis Good   Assessment Pt is a 64y/o male admitted to the hospital for an elected s/p L knee unicompartmental arthroplasty(3/4) 2* hx OA. Pt is currently allowed WBAT L LE. Pt with PMH COPD, DM, HTN. PTA pt states independence with all aspects of his ADLs, transfers, ambulation--w/o device; neg falls, neg home alone, +. During initial eval, pt demonstrated slight deficits with his functional balance, functional mobility, and activity tolerance. Pt was able to demonstrate good ADL status and states no concerns about going  "directly home. Pt would benefit from continued P.T. to improve his overall endurance, L LE strength, balance and mobility. Acute OT tx not indicated at this time 2* limited ADL deficits. The patient's raw score on the AM-PAC Daily Activity Inpatient Short Form is 22. A raw score of greater than or equal to 19 suggests the patient may benefit from discharge to home. Please refer to the recommendation of the Occupational Therapist for safe discharge planning.   Goals   Patient Goals \"to go home\"   Plan   OT Frequency Eval only   Discharge Recommendation   Rehab Resource Intensity Level, OT III (Minimum Resource Intensity)  (OPPT)   AM-PAC Daily Activity Inpatient   Lower Body Dressing 3   Bathing 3   Toileting 4   Upper Body Dressing 4   Grooming 4   Eating 4   Daily Activity Raw Score 22   Daily Activity Standardized Score (Calc for Raw Score >=11) 47.1   AM-PAC Applied Cognition Inpatient   Following a Speech/Presentation 4   Understanding Ordinary Conversation 4   Taking Medications 4   Remembering Where Things Are Placed or Put Away 4   Remembering List of 4-5 Errands 4   Taking Care of Complicated Tasks 4   Applied Cognition Raw Score 24   Applied Cognition Standardized Score 62.21   Temo Mendez       "

## 2024-03-04 NOTE — DISCHARGE INSTR - AVS FIRST PAGE
Dr. Farrar Knee Replacement    What to Expect/Activity  It is normal to have some discomfort in your knee for several days to weeks.  You are weight bearing as tolerated to your operative leg with assist devices.  Please use crutches/walker when ambulating until your follow-up  Swelling and discomfort in the knee is normal for several days after surgery. For the first 2-3 days, use ice around the knee to help. Use for 20-30 minutes every 1-2 hours for 48 hours, while awake. You may continue beyond 48 hours as needed.  Place one or two pillows underneath your calf, not your knee, to reduce swelling.  Physical therapy on your own at home should start as soon as possible (see below). Please perform heel slides and extension exercises on your own as well (see diagram).  Please use incentive spirometer 10 times per hour while awake (see diagram).    Dressing/Wound Care/Bathing  You may remove your toe-to-groin dressing 24 hours after surgery. There will be a surgical dressing over your incision that stays in place until follow-up unless water gets under the bandage and then it should be removed.   You may start showering 24 hours after surgery, the surgical dressing will remain in place. Please pat the dressing dry. If you notice the dressing appears saturated or is starting to come off, please replace with dry dressing.  You can keep the dressing in place until follow-up in the office.   Do not place any creams, ointments or gels on or around the incision.  No baths, swimming or submerging until cleared by Dr. Farrar    Pain Management/Medications  You may resume your usual medications.  Please take the following medications:  Anti-coagulation (blood clot prevention) - aspirin 81mg twice daily for 4 weeks  Pain medication:  Narcotic: Take as directed  NSAID/Anti-inflammatory: Take as directed  Tylenol 1000mg every 8 hours  Zofran (ondasetron) - 4mg every 8 hours as needed for nausea  Stool softeners (senna/colace) -  take daily to prevent constipation as narcotic pain medication causes constipation  Antibiotic - take as directed if prescribed   If you have questions or pain concerns, please contact the office. Pain medication cannot remove all post-operative pain.    Follow up/Call if:  The findings of your surgery will be explained to you and your family immediately after surgery. However, in the post-operative period, during recovery from anesthesia you may not fully remember or fully understand what was said. This will be again gone over when you return for your post-op appointment.  Please contact Dr. Farrar's office if you experience the following:  Excessive bleeding (bleeding through your dressing)  Fever greater than 101 degrees F after 48 hours (low grade fevers the day or two after surgery are normal)  Persistent nausea or vomiting  Decreased sensation or discoloration of the operative limb  Pain or swelling that is getting worse and not better with medication    Dr. Farrar's Office Contact: [phone]

## 2024-03-05 ENCOUNTER — TELEPHONE (OUTPATIENT)
Dept: OBGYN CLINIC | Facility: HOSPITAL | Age: 64
End: 2024-03-05

## 2024-03-05 NOTE — TELEPHONE ENCOUNTER
"Patient contacted for a postoperative follow up assessment. Patient reports doing \"pretty good.\" Pt reports when pain medications wear off, 7-8/10 pain, but he states  they have minimal pain and it is relieved with medication regimen. He is ambulating with the RW.  Patient denies increase in swelling , stating it \"is the same\" and dressing is clean, dry and intact. Patient is icing the site regularly.     We reviewed patients AVS medication list. Patient is taking Tylenol 1000mg every 8 hours, Celebrex 200mg BID, Oxycodone 5mg PRN, ASA 81mg BID, Senokot daily , Duricef BID,  Patient has not yet had a BM but is passing gas.      Patient denies nausea, vomiting, abdominal pain, chest pain, shortness of breath, fever, dizziness and calf pain. Patient does not have any other questions or concerns at this time. Pt was encouraged to call with any questions, concerns or issues.    "

## 2024-03-07 NOTE — ADDENDUM NOTE
Addendum  created 03/07/24 0713 by Phan Mcmullen DO    Clinical Note Signed, Diagnosis association updated, Intraprocedure Blocks edited, SmartForm saved

## 2024-03-19 ENCOUNTER — APPOINTMENT (OUTPATIENT)
Dept: RADIOLOGY | Facility: MEDICAL CENTER | Age: 64
End: 2024-03-19
Payer: COMMERCIAL

## 2024-03-19 ENCOUNTER — OFFICE VISIT (OUTPATIENT)
Dept: OBGYN CLINIC | Facility: MEDICAL CENTER | Age: 64
End: 2024-03-19

## 2024-03-19 VITALS — BODY MASS INDEX: 35.99 KG/M2 | HEIGHT: 69 IN | WEIGHT: 243 LBS

## 2024-03-19 DIAGNOSIS — Z96.652 STATUS POST TOTAL KNEE REPLACEMENT, LEFT: Primary | ICD-10-CM

## 2024-03-19 DIAGNOSIS — Z96.652 STATUS POST TOTAL KNEE REPLACEMENT, LEFT: ICD-10-CM

## 2024-03-19 PROCEDURE — 99024 POSTOP FOLLOW-UP VISIT: CPT | Performed by: STUDENT IN AN ORGANIZED HEALTH CARE EDUCATION/TRAINING PROGRAM

## 2024-03-19 PROCEDURE — 73562 X-RAY EXAM OF KNEE 3: CPT

## 2024-03-19 NOTE — PROGRESS NOTES
Subjective: 63 y.o. male presents to the office 2 weeks s/p left medial unicompartmental knee arthroplasty performed on 03/04/2024. He has been doing well overall. He notes anteromedial knee soreness with deep flexion. Patient seen and examined. Pain controlled. Progressing well and ambulates with the assistance of a cane. Incision without drainage. Denies fevers or chills    Physical Exam:  Incision: clean, dry, and intact  ROM: 0-115  5/5 IP/Q/HS/TA/GS, 2+ DP/PT, SILT DP/SP/S/S/TN    XR left knee: s/p cemented oxford medial unicompartmental knee arthroplasty, components in good position. No fracture or dislocation    Assessment/Plan:  2 weeks s/p left medial unicompartmental knee arthroplasty performed on 03/04/2024 doing excellent     - continue multi-modal pain control   - Weight bearing status: as tolerated  - DVT ppx: aspirin 81 mg BID for an additional 2 weeks (4 weeks total)  - Incision care: avoid scrubbing or submerging  - Start PT/OT, referral provided  - Work note provided to return to work limited at his progression and allowing time to attend physical therapy.  - F/U in 4 weeks    Scribe Attestation      I,:  Karen Preston am acting as a scribe while in the presence of the attending physician.:       I,:  Kyle Farrar, DO personally performed the services described in this documentation    as scribed in my presence.:

## 2024-03-19 NOTE — LETTER
March 19, 2024     Patient: Balta Monson  YOB: 1960  Date of Visit: 3/19/2024      To Whom it May Concern:    Balta Monson is under my professional care. Balta was seen in my office on 3/19/2024. Balta may return to work limited at his progression. Allow time to attend physical therapy.    If you have any questions or concerns, please don't hesitate to call.         Sincerely,          Kyle Farrar, DO

## 2024-03-21 ENCOUNTER — EVALUATION (OUTPATIENT)
Dept: PHYSICAL THERAPY | Facility: CLINIC | Age: 64
End: 2024-03-21
Payer: COMMERCIAL

## 2024-03-21 DIAGNOSIS — Z96.652 STATUS POST TOTAL KNEE REPLACEMENT, LEFT: ICD-10-CM

## 2024-03-21 PROCEDURE — 97140 MANUAL THERAPY 1/> REGIONS: CPT | Performed by: PHYSICAL THERAPIST

## 2024-03-21 PROCEDURE — 97110 THERAPEUTIC EXERCISES: CPT | Performed by: PHYSICAL THERAPIST

## 2024-03-21 PROCEDURE — 97162 PT EVAL MOD COMPLEX 30 MIN: CPT | Performed by: PHYSICAL THERAPIST

## 2024-03-21 NOTE — PROGRESS NOTES
PT Evaluation     Today's date: 3/21/2024  Patient name: Balta Monson  : 1960  MRN: [id number]  Referring provider: Kyle Farrar, *  Dx:   Encounter Diagnosis     ICD-10-CM    1. Status post medial unicompartmental knee replacement, left  Z96.652 Ambulatory Referral to Physical Therapy          Start Time: 1016  Stop Time: 1100  Total time in clinic (min): 44 minutes    Assessment  Assessment details: Patient is a 63 y.o. male who presents to physical therapy s/p left medial unicompartmental knee arthroplasty 3/4/24. Patient presents to evaluation with pain, decreased range of motion, decreased strength, decreased quality of gait/ambulatory status, and decreased tolerance to activity. Patient demonstrates good tolerance to treatment and was provided with a written copy of their initial home exercise program focusing on knee ROM, LE flexibility, and quad re-education - pt was encouraged to perform daily per tolerance. I discussed risks, benefits, and alternatives to treatment, and answered all patient questions to patient satisfaction. Patient presents with baseline FOTO score of 57 indicating limited tolerance/ability to complete ADLs. Patient is an appropriate candidate for skilled PT and would benefit from skilled PT services to address the aforementioned impairments, achieve goals, maximize function, and improve quality of life. Pt is in agreement with this plan.    Patient Education: activity modifications as needed, pacing of activities, importance of HEP compliance, PT prognosis/POC    Impairments: abnormal gait, abnormal or restricted ROM, activity intolerance, impaired balance, impaired physical strength, lacks appropriate home exercise program and pain with function    Goals  ST weeks  Pt will demonstrate good understanding and compliance with HEP   Pt will increase active left knee extension to 0* to normalize gait with TKE at IC phase of gait   Pt will increase active left knee  flexion to 120* to normalize gait and improve tolerance/ability to perform functional activities to include stairs and squatting   Pt will decrease left knee pain to 2-3/10 with activity      LT weeks  Pt will increase left hip/knee strength to 5-/5 to allow for improved ability to squat, negotiate stairs, and prolonged community ambulation  Pt will decrease left knee pain to 0-1/10 with activity     Pt will demonstrate negative Elys test on left   Pt will ambulate with least restrictive AD and normal gait mechanics  Pt will exhibit proper squatting/lifting mechanics without reliance on external cues for correction of form   Pt will be able to tolerate prolonged standing/walking activities > 30 minutes without provocation of knee pain    Pt will improve FOTO score to > or = to 77 to indicate improved functional abilities       Plan  Patient would benefit from: PT eval and skilled physical therapy  Planned modality interventions: thermotherapy: hydrocollator packs and cryotherapy  Planned therapy interventions: ADL training, activity modification, joint mobilization, manual therapy, neuromuscular re-education, patient education, postural training, self care, strengthening, stretching, therapeutic activities, therapeutic exercise, home exercise program, graded exercise, graded activity, gait training, functional ROM exercises, flexibility, body mechanics training and balance  Frequency: 2x week  Duration in weeks: 8  Plan of Care beginning date: 3/21/2024  Plan of Care expiration date: 2024  Treatment plan discussed with: patient        Subjective Evaluation    History of Present Illness  Mechanism of injury: Pt reports to PT with s/p left medial unicompartmental knee arthroplasty 3/4/24. Pt had MD follow up 2 days ago with good report from surgeon and instructed to initiate PT. Pt reports hx of knee pain for years and opted for surgical intervention. Pt was using a cane for ambulation up until 2 days ago but  has since weaned from this and now walking unassisted, pt denies buckling episodes or falls. Pt denies shortness of breath/fevers. Pt admits to some lingering numbness along outer aspect of knee. 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 Pt reports pain with pivoting/twisting, prolonged standing/walking, squatting, stairs, sleeping at night, as well as stiffness in the AM.     Aggravating factors: pivoting/twisting, prolonged standing/walking, squatting, stairs, sleeping at night, as well as stiffness in the AM. Pt admits to continued swelling and feels this is worse at the end of the day.     Easing Factors:  Rest, ice, NSAIDs    PLOF:  Hx of knee pain for years, was walking unassisted prior to surgery    PMH: Diabetic, HTN (controlled w/ meds), skin cancer  Patient Goals  Patient goals for therapy: increased motion, decreased edema, increased strength, decreased pain, improved balance, return to sport/leisure activities, independence with ADLs/IADLs and return to work    Pain  Current pain ratin  At best pain ratin  At worst pain ratin  Quality: dull ache and sharp          Objective     General Comments:      Knee Comments  Left knee AROM: 0-2-105*    Left knee MMT deferred secondary to phase of healing, exhibits good volitional quad activation and able to complete independent SLR    Gait Assessment: Pt ambulates unassisted with slight antalgic gait, maintains slight knee flexion throughout gait cycle     Inspection of left knee reveals good healing of incision, no drainage or signs of infection/DVT    Light touch intact and symmetrical bilateral LEs    FOTO: 57               Diagnosis: s/p (L) medial unicompartmental knee arthroplasty 3/4/24   Precautions: Diabetic, HTN (controlled w/ meds), skin cancer   POC Expires: 24   Re-evaluation Date: 24   FOTO Scores/Date: Goal - 77; 3/21 - 57   Visit Count 1/10       Manuals  "3/21       L knee PROM all planes per tolerance KD       Patellar mobs KD               Ther Ex 3/21       Bike NV       Gastroc stretch 3x30\"        Heel slides w/ strap 10x5\" (110*)       Heel prop 2 min       Quad set 10x10\" (0*)       SLR 2x10                                Pt education KD       HEP Creation/instruction       Neuro Re-Ed                                                               Ther Act                                                                                 Modalities                                            "

## 2024-03-27 ENCOUNTER — OFFICE VISIT (OUTPATIENT)
Dept: PHYSICAL THERAPY | Facility: CLINIC | Age: 64
End: 2024-03-27
Payer: COMMERCIAL

## 2024-03-27 DIAGNOSIS — Z96.652 STATUS POST TOTAL KNEE REPLACEMENT, LEFT: Primary | ICD-10-CM

## 2024-03-27 PROCEDURE — 97110 THERAPEUTIC EXERCISES: CPT | Performed by: PHYSICAL THERAPIST

## 2024-03-27 PROCEDURE — 97140 MANUAL THERAPY 1/> REGIONS: CPT | Performed by: PHYSICAL THERAPIST

## 2024-03-27 NOTE — PROGRESS NOTES
"Daily Note     Today's date: 3/27/2024  Patient name: Balta Monson  : 1960  MRN: [id number]  Referring provider: Kyle Farrar, *  Dx:   Encounter Diagnosis     ICD-10-CM    1. Status post medial unicompartmental knee replacement, left  Z96.652           Start Time: 1015  Stop Time: 1100  Total time in clinic (min): 45 minutes    Subjective: Pt reports current pain is 0/10 at rest, increases to 2/10 with ROM exercises. Pt notes good compliance with home exercises.       Objective: See treatment diary below      Assessment: Pt continues to progress well with mobility, good improvement in knee flexion and achieves 115* with AA heel slides and active flexion to 117*. Pt remains with -2* knee extension at baseline but improves to 0* post manual tx. Pt remains easily fatigued with SLRs but exhibits good eccentric control and able to maintain TKE throughout. Introduced SL abd and LAQs with good tolerance and no pain, only c/o muscle fatigue. Introduced lateral band walking and sit to stands with good form and control, mild c/o knee pain with squatting but remains minimal and doesn't increase with repetition. HEP was updated and reviewed, will progress next visit as able.       Plan: Continue per plan of care.  Progress treatment as tolerated.       Diagnosis: s/p (L) medial unicompartmental knee arthroplasty 3/4/24   Precautions: Diabetic, HTN (controlled w/ meds), skin cancer   POC Expires: 24   Re-evaluation Date: 24   FOTO Scores/Date: Goal - 77; 3/21 -    Visit Count 1/10 2/10      Manuals 3/21 3/27      L knee PROM all planes per tolerance KD KD      Patellar mobs KD KD              Ther Ex 3/21 3/27      Bike NV L1 x 5 min      Gastroc stretch 3x30\"  3x30\"       Heel slides w/ strap 10x5\" (110*) 10x5\" (115*), then 10x active       Heel prop 2 min 2 min       Quad set 10x10\" (0*) 10x10\" (0*)       SLR 2x10  2x10      SL hip abd  2x10 1#      LAQ  2x10x3\" 2#      Heel raise w/ quad set  " 10x DL, 10x SL      Lateral band walk  3 laps grn      Sit to stands  2x10 to low mat                              Pt education KD KD      HEP Creation/instruction Updated       Neuro Re-Ed                                                               Ther Act                                                                                 Modalities

## 2024-03-29 ENCOUNTER — OFFICE VISIT (OUTPATIENT)
Dept: PHYSICAL THERAPY | Facility: CLINIC | Age: 64
End: 2024-03-29
Payer: COMMERCIAL

## 2024-03-29 DIAGNOSIS — Z96.652 STATUS POST TOTAL KNEE REPLACEMENT, LEFT: Primary | ICD-10-CM

## 2024-03-29 PROCEDURE — 97140 MANUAL THERAPY 1/> REGIONS: CPT | Performed by: PHYSICAL THERAPIST

## 2024-03-29 PROCEDURE — 97110 THERAPEUTIC EXERCISES: CPT | Performed by: PHYSICAL THERAPIST

## 2024-03-29 NOTE — PROGRESS NOTES
"Daily Note     Today's date: 3/29/2024  Patient name: Balta Monson  : 1960  MRN: [id number]  Referring provider: Kyle Farrar, *  Dx:   Encounter Diagnosis     ICD-10-CM    1. Status post medial unicompartmental knee replacement, left  Z96.652           Start Time: 0845  Stop Time: 09  Total time in clinic (min): 45 minutes    Subjective: Pt reports moderate muscle fatigue/soreness after last visit, denies increase in pain. Pt reports current pain 0/10.       Objective: See treatment diary below      Assessment: Pt remains with some initial stiffness with end range flexion that improves post manual tx and improved actively to 118*, remains with approximately -2* of active knee extension pre-treatment but improves to 0* after ROM exercises and manual tx. Held significant progressions in exercises and resistance this date due to reports of moderate muscle fatigue after last session, however was able to tolerate all exercises per chart well without increase in pain. Pt reliant on cues throughout session for slow eccentric control and form, remains with good mechanics with squatting without compensatory strategies noted. HEP was updated and reviewed, will continue to progress next visit as able.       Plan: Continue per plan of care.  Progress treatment as tolerated.       Diagnosis: s/p (L) medial unicompartmental knee arthroplasty 3/4/24   Precautions: Diabetic, HTN (controlled w/ meds), skin cancer   POC Expires: 24   Re-evaluation Date: 24   FOTO Scores/Date: Goal - 77; 3/21 -    Visit Count 10 2/10 3/10     Manuals 3/21 3/27 3/29     L knee PROM all planes per tolerance KD KD KD     Patellar mobs KD KD KD             Ther Ex 3/21 3/27 3/29     Bike NV L1 x 5 min L1 x 5 min     Gastroc stretch 3x30\"  3x30\"  3x30\"     Heel slides w/ strap 10x5\" (110*) 10x5\" (115*), then 10x active  10x5\" (115*), then 10x active (118*)     Supine hip flexor/quad stretch   3x30\"      Heel prop 2 min 2 " "min  2 min      Quad set 10x10\" (0*) 10x10\" (0*)  10x10\" (0*)     SLR 2x10  2x10 2x10 1#     SL hip abd  2x10 1# 2x10 1#     LAQ  2x10x3\" 2# 2x10x3\" 4#     Heel raise w/ quad set  10x DL, 10x SL 2x10 SL     Lateral band walk  3 laps grn 4 laps blue     Sit to stands  2x10 to low mat 2x10 to low mat                             Pt education KD KD KD     HEP Creation/instruction Updated  Updated      Neuro Re-Ed                                                               Ther Act                                                                                 Modalities                                              "

## 2024-04-03 ENCOUNTER — OFFICE VISIT (OUTPATIENT)
Dept: PHYSICAL THERAPY | Facility: CLINIC | Age: 64
End: 2024-04-03
Payer: COMMERCIAL

## 2024-04-03 DIAGNOSIS — Z96.652 STATUS POST TOTAL KNEE REPLACEMENT, LEFT: Primary | ICD-10-CM

## 2024-04-03 PROCEDURE — 97110 THERAPEUTIC EXERCISES: CPT | Performed by: PHYSICAL THERAPIST

## 2024-04-03 NOTE — PROGRESS NOTES
"Daily Note     Today's date: 4/3/2024  Patient name: Balta Monson  : 1960  MRN: [id number]  Referring provider: Kyle Farrar, *  Dx:   Encounter Diagnosis     ICD-10-CM    1. Status post medial unicompartmental knee replacement, left  Z96.652           Start Time: 928  Stop Time: 1020  Total time in clinic (min): 52 minutes    Subjective: Pt reports being very active on Friday and Saturday this past weekend and felt good during but reports delayed onset of knee pain/stiffness. Pt reports this pain has since dissipated and c/o more stiffness today.      Objective: See treatment diary below      Assessment: Pt continues to progress well with ROM and achieves essentially full active knee flexion/extension today and able to withhold manual interventions. Progressed weight with proximal hip strengthening with good tolerance but notable fatigue. Pt was able to introduce external load with sit to stands with increased challenge but mild increase in knee discomfort with exercise, however stays stable and doesn't progressively worsen with repetition. Initiated static SL balance with good challenge and only able to maintain for approximately 10\" holds before requiring UE assist to prevent LOB, however denies increased knee pain. Pt encouraged he continues to progress well per phase of rehab, instructed to continue to modify/pace home activities as needed to avoid exacerbation of pain symptoms. Will progress next visit as able.       Plan: Continue per plan of care.  Progress treatment as tolerated.       Diagnosis: s/p (L) medial unicompartmental knee arthroplasty 3/4/24   Precautions: Diabetic, HTN (controlled w/ meds), skin cancer   POC Expires: 24   Re-evaluation Date: 24   FOTO Scores/Date: Goal - 77; 3/21 -    Visit Count 1/10 2/10 3/10 4/10    Manuals 3/21 3/27 3/29 4/3    L knee PROM all planes per tolerance KD KD KD     Patellar mobs KD KD KD             Ther Ex 3/21 3/27 3/29 4/3  " "  Bike NV L1 x 5 min L1 x 5 min L1 x 5 min    Heel slides w/ strap 10x5\" (110*) 10x5\" (115*), then 10x active  10x5\" (115*), then 10x active (118*) 10x5\" (117*), then 10x active (119*)    Supine hip flexor/quad stretch   3x30\"  3x30\"     Heel prop 2 min 2 min  2 min  2 min    Quad set 10x10\" (0*) 10x10\" (0*)  10x10\" (0*) 10x10\" (0*)    SLR 2x10  2x10 2x10 1# 2x10 2#    SL hip abd  2x10 1# 2x10 1# 2x10 2#    Heel raise w/ quad set  10x DL, 10x SL 2x10 SL 2x10 SL    Lateral band walk  3 laps grn 4 laps blue 4 laps blue    Sit to stands  2x10 to low mat 2x10 to low mat 2x10 low mat 10#    SLS    5x10\"     SL leg press    55# 2x10    Step ups    6\" 2x10                                    Pt education KD KD KD KD    HEP Creation/instruction Updated  Updated  Updated     Neuro Re-Ed                                                               Ther Act                                                                                 Modalities                                                "

## 2024-04-05 ENCOUNTER — OFFICE VISIT (OUTPATIENT)
Dept: PHYSICAL THERAPY | Facility: CLINIC | Age: 64
End: 2024-04-05
Payer: COMMERCIAL

## 2024-04-05 DIAGNOSIS — Z96.652 STATUS POST TOTAL KNEE REPLACEMENT, LEFT: Primary | ICD-10-CM

## 2024-04-05 PROCEDURE — 97140 MANUAL THERAPY 1/> REGIONS: CPT

## 2024-04-05 PROCEDURE — 97110 THERAPEUTIC EXERCISES: CPT

## 2024-04-05 NOTE — PROGRESS NOTES
"Daily Note     Today's date: 2024  Patient name: Balta Monson  : 1960  MRN: [id number]  Referring provider: Kyle Farrar, *  Dx:   Encounter Diagnosis     ICD-10-CM    1. Status post medial unicompartmental knee replacement, left  Z96.652                      Subjective: Reports continues increased soreness and stiffness in L knee following \"overdoing it\" last weekend; this has improved t/o the week and is really only bothersome on inside of knee when going from sit to stand now.       Objective: See treatment diary below      Assessment: Tolerated treatment well. Pt w/ soft tissue restrictions in distal portion of VMO, improved pain reported w/ knee flexion and eccentric quad use following STM to area. Demonstrates good control w/ leg press and step-ups today. Patient demonstrated fatigue post treatment, exhibited good technique with therapeutic exercises, and would benefit from continued PT      Plan: Continue per plan of care.  Progress treatment as tolerated.       Diagnosis: s/p (L) medial unicompartmental knee arthroplasty 3/4/24   Precautions: Diabetic, HTN (controlled w/ meds), skin cancer   POC Expires: 24   Re-evaluation Date: 24   FOTO Scores/Date: Goal - 77; 3/21 -    Visit Count 10 2/10 3/10 410 5/10   Manuals 3/21 3/27 3/29 4/3 4/5   L knee PROM all planes per tolerance KD KD KD     Patellar mobs KD KD KD     STM medial aspect of knee     VR           Ther Ex 3/21 3/27 3/29 4/3    Bike NV L1 x 5 min L1 x 5 min L1 x 5 min L1 x5min   Heel slides w/ strap 10x5\" (110*) 10x5\" (115*), then 10x active  10x5\" (115*), then 10x active (118*) 10x5\" (117*), then 10x active (119*) 10x5\" (115*), then 10x active (120*)   Supine hip flexor/quad stretch   3x30\"  3x30\"  3x30\"   Heel prop 2 min 2 min  2 min  2 min 2 min   Quad set 10x10\" (0*) 10x10\" (0*)  10x10\" (0*) 10x10\" (0*) 10x10\" (0*)   SLR 2x10  2x10 2x10 1# 2x10 2# 2x10 3#   SL hip abd  2x10 1# 2x10 1# 2x10 2# 2x10 3#   Heel " "raise w/ quad set  10x DL, 10x SL 2x10 SL 2x10 SL Missed, NV   Lateral band walk  3 laps grn 4 laps blue 4 laps blue 4 laps blue   Sit to stands  2x10 to low mat 2x10 to low mat 2x10 low mat 10# 2x10 low mat 10#   SLS    5x10\"     SL leg press    55# 2x10 55# 2x10   Step ups    6\" 2x10 6\" 2x10                                   Pt education KD KD KD KD    HEP Creation/instruction Updated  Updated  Updated     Neuro Re-Ed                                                               Ther Act                                                                                 Modalities                                                  "

## 2024-04-10 ENCOUNTER — OFFICE VISIT (OUTPATIENT)
Dept: PHYSICAL THERAPY | Facility: CLINIC | Age: 64
End: 2024-04-10
Payer: COMMERCIAL

## 2024-04-10 DIAGNOSIS — Z96.652 STATUS POST TOTAL KNEE REPLACEMENT, LEFT: Primary | ICD-10-CM

## 2024-04-10 PROCEDURE — 97140 MANUAL THERAPY 1/> REGIONS: CPT | Performed by: PHYSICAL THERAPIST

## 2024-04-10 PROCEDURE — 97112 NEUROMUSCULAR REEDUCATION: CPT | Performed by: PHYSICAL THERAPIST

## 2024-04-10 PROCEDURE — 97110 THERAPEUTIC EXERCISES: CPT | Performed by: PHYSICAL THERAPIST

## 2024-04-10 NOTE — PROGRESS NOTES
"Daily Note     Today's date: 4/10/2024  Patient name: Balta Monson  : 1960  MRN: [id number]  Referring provider: Kyle Farrar, *  Dx:   Encounter Diagnosis     ICD-10-CM    1. Status post medial unicompartmental knee replacement, left  Z96.652           Start Time: 930  Stop Time: 1020  Total time in clinic (min): 50 minutes    Subjective: Pt denies any pain at rest, remains painful with certain maneuvers but pain stays minimal and is improving.       Objective: See treatment diary below      Assessment: Pt continues to progress well with knee mobility, felt relief with STM of medial knee and thigh last visit and was performed again with good tolerance, as well as patellar mobilizations with emphasis on superior patellar glides. Pt remains easily fatigued with proximal hip strengthening on mat table but maintains good eccentric control throughout. Increased box height with fwd step ups with initial cues to avoid compensatory vaulting off contralateral leg during concentric phase and improves with cues, as well as introduced lateral step ups with more challenge with stability but minimal pain noted with increased height. Introduced static lunges bilaterally with mild pain with left leg in rear but improved with cues for form and modifying depth. Significant improvement in static SL balance today and able to maintain for 20-30\" before relying on UE assist on handrail. Pt continues to progress well per phase of rehab, encouraged to continue with HEP daily as symptoms allow and will progress next visit as able.       Plan: Continue per plan of care.  Progress treatment as tolerated.       Diagnosis: s/p (L) medial unicompartmental knee arthroplasty 3/4/24   Precautions: Diabetic, HTN (controlled w/ meds), skin cancer   POC Expires: 24   Re-evaluation Date: 24   FOTO Scores/Date: Goal - 77; 3/21 -    Visit Count 6/10 2/10 3/10 4/10 5/10   Manuals 4/10 3/27 3/29 4/3 4/5   L knee PROM all " "planes per tolerance  KD KD     Patellar mobs KD KD KD     STM medial aspect of knee KD    VR           Ther Ex 4/10 3/27 3/29 4/3 4/5   Bike L2 x 5 min L1 x 5 min L1 x 5 min L1 x 5 min L1 x5min   Heel slides w/ strap 10x5\" (120*), then 10x active  10x5\" (115*), then 10x active  10x5\" (115*), then 10x active (118*) 10x5\" (117*), then 10x active (119*) 10x5\" (115*), then 10x active (120*)   Supine hip flexor/quad stretch 3x30\"   3x30\"  3x30\"  3x30\"   SLR 2x10 3# 2x10 2x10 1# 2x10 2# 2x10 3#   SL hip abd 2x10 3# 2x10 1# 2x10 1# 2x10 2# 2x10 3#   Lateral band walk 4 laps blue 3 laps grn 4 laps blue 4 laps blue 4 laps blue   Sit to stands TA 2x10 to low mat 2x10 to low mat 2x10 low mat 10# 2x10 low mat 10#   SLS 3x20-30\"    5x10\"     SL leg press 65# 3x10   55# 2x10 55# 2x10   Step ups TA   6\" 2x10 6\" 2x10                                   Pt education KD KD KD KD    HEP Updated  Updated  Updated  Updated     Neuro Re-Ed                                                               Ther Act 4/10           Sit to stand 20# 2x10       Step ups 8\" box 2x10 ea (fwd/lat)       Static lunges 2x10 ea                                                   Modalities                                                    "

## 2024-04-12 ENCOUNTER — OFFICE VISIT (OUTPATIENT)
Dept: PHYSICAL THERAPY | Facility: CLINIC | Age: 64
End: 2024-04-12
Payer: COMMERCIAL

## 2024-04-12 DIAGNOSIS — Z96.652 STATUS POST TOTAL KNEE REPLACEMENT, LEFT: Primary | ICD-10-CM

## 2024-04-12 PROCEDURE — 97530 THERAPEUTIC ACTIVITIES: CPT | Performed by: PHYSICAL THERAPIST

## 2024-04-12 PROCEDURE — 97110 THERAPEUTIC EXERCISES: CPT | Performed by: PHYSICAL THERAPIST

## 2024-04-12 PROCEDURE — 97112 NEUROMUSCULAR REEDUCATION: CPT | Performed by: PHYSICAL THERAPIST

## 2024-04-12 NOTE — PROGRESS NOTES
Daily Note     Today's date: 2024  Patient name: Balta Monson  : 1960  MRN: [id number]  Referring provider: Kyle Farrar, *  Dx:   Encounter Diagnosis     ICD-10-CM    1. Status post medial unicompartmental knee replacement, left  Z96.652           Start Time: 845  Stop Time: 945  Total time in clinic (min): 60 minutes    Subjective: Pt denies any pain currently, still admits to occasional pain that he describes as a band around anterior aspect of knee but feels this is occurring less frequently and less intense. Pt is to follow up with surgeon next week.       Objective: See treatment diary below      Assessment: Pt continues to progress well with knee flexion mobility, able to achieve full revolutions on recumbent bike without pain/difficulty and obtains full motion with heel slides today which are pain-free. Transitioned from mat table SLRs to standing hip 3-way with notable fatigue but no pain, cues issued for proper form/sequencing and to avoid compensatory trunk leaning throughout. Progressed to dynamic SL balance activities to include SLS w/ side to side DB passing and SL cone  to low mat table with significant challenge but no pain, cues required for proper hip hinging versus squatting during cone . Further challenged dynamic stability/strength with resisted lateral walking at [place] with cues for slow eccentric control and to maintain mini squat throughout. Pt denies increase in pain throughout tx or post session, only muscle fatigue. Will continue to progress next visit as able.       Plan: Continue per plan of care.  Progress treatment as tolerated.       Diagnosis: s/p (L) medial unicompartmental knee arthroplasty 3/4/24   Precautions: Diabetic, HTN (controlled w/ meds), skin cancer   POC Expires: 24   Re-evaluation Date: 24   FOTO Scores/Date: Goal - 77; 3/21 - 57   Visit Count 6/10 7/10 3/10 4/10 5/10   Manuals 4/10 4/12 3/29 4/3 4   L knee PROM all  "planes per tolerance   KD     Patellar mobs KD  KD     STM medial aspect of knee KD    VR           Ther Ex 4/10 4/12 3/29 4/3 4/5   Bike L2 x 5 min L2 x 5 min L1 x 5 min L1 x 5 min L1 x5min   Heel slides w/ strap 10x5\" (120*), then 10x active  10x5\" (122*), then 10x active  10x5\" (115*), then 10x active (118*) 10x5\" (117*), then 10x active (119*) 10x5\" (115*), then 10x active (120*)   Supine hip flexor/quad stretch 3x30\"  3x30\"  3x30\"  3x30\"  3x30\"   SLR 2x10 3# DC 2x10 1# 2x10 2# 2x10 3#   SL hip abd 2x10 3# DC 2x10 1# 2x10 2# 2x10 3#   Lateral band walk 4 laps blue 4 laps blue 4 laps blue 4 laps blue 4 laps blue   SLS 3x20-30\"  Neuro  5x10\"     SL leg press 65# 3x10 65# 3x10  55# 2x10 55# 2x10                                   Pt education KD KD KD KD    HEP Updated  Updated  Updated  Updated     Neuro Re-Ed  4/12      Standing hip 3-way  Grn 20x ea bilaterally      SLS  W/ 3# DB pass 10x      SL cone   3 cones to low mat; 3x      Resisted lateral walking at [place]  12# 4 laps ea                             Ther Act 4/10  4/12         Sit to stand 20# 2x10 25# 2x10      Step ups 8\" box 2x10 ea (fwd/lat) 8\" box 2x10 ea (fwd/lat)      Static lunges 2x10 ea 2x10 ea                                                  Modalities                                                      "

## 2024-04-16 ENCOUNTER — OFFICE VISIT (OUTPATIENT)
Dept: OBGYN CLINIC | Facility: MEDICAL CENTER | Age: 64
End: 2024-04-16

## 2024-04-16 VITALS
SYSTOLIC BLOOD PRESSURE: 155 MMHG | BODY MASS INDEX: 36.14 KG/M2 | WEIGHT: 244 LBS | HEART RATE: 64 BPM | HEIGHT: 69 IN | DIASTOLIC BLOOD PRESSURE: 90 MMHG

## 2024-04-16 DIAGNOSIS — Z96.652 STATUS POST TOTAL KNEE REPLACEMENT, LEFT: Primary | ICD-10-CM

## 2024-04-16 PROCEDURE — 99024 POSTOP FOLLOW-UP VISIT: CPT | Performed by: STUDENT IN AN ORGANIZED HEALTH CARE EDUCATION/TRAINING PROGRAM

## 2024-04-17 ENCOUNTER — OFFICE VISIT (OUTPATIENT)
Dept: PHYSICAL THERAPY | Facility: CLINIC | Age: 64
End: 2024-04-17
Payer: COMMERCIAL

## 2024-04-17 DIAGNOSIS — Z96.652 STATUS POST TOTAL KNEE REPLACEMENT, LEFT: Primary | ICD-10-CM

## 2024-04-17 PROCEDURE — 97530 THERAPEUTIC ACTIVITIES: CPT | Performed by: PHYSICAL THERAPIST

## 2024-04-17 PROCEDURE — 97110 THERAPEUTIC EXERCISES: CPT | Performed by: PHYSICAL THERAPIST

## 2024-04-17 PROCEDURE — 97112 NEUROMUSCULAR REEDUCATION: CPT | Performed by: PHYSICAL THERAPIST

## 2024-04-17 NOTE — PROGRESS NOTES
Daily Note     Today's date: 2024  Patient name: Balta Monson  : 1960  MRN: [id number]  Referring provider: Kyle Farrar, *  Dx:   Encounter Diagnosis     ICD-10-CM    1. Status post medial unicompartmental knee replacement, left  Z96.652           Start Time: 08  Stop Time: 09  Total time in clinic (min): 50 minutes    Subjective: Pt saw surgeon yesterday and was given a good report and instructed to continue with therapy. Pt denies any current pain or pain after last visit and feels he is progressing well.       Objective: See treatment diary below      Assessment: Pt continues to progress well with knee mobility and achieves full active motion today, discharged ROM exercises but encouraged he can continue with at home, especially in the morning if he is experiencing increased stiffness. Pt remains reliant on cues during newly added 3-way standing hip to avoid compensatory trunk lean, relies on constant UE assist to help with stability. Pt remains easily challenged with static lunges and cues for eccentric control and knee mechanics provided. Increased step height for fwd/lat step ups with cues to avoid vaulting off contralateral leg which improves with practice. Initiated step downs with overall good tolerance and no pain but reports of fatigue and instability with occasional use of UE on handrail. Pt denies increase in pain post tx, encouraged to continue with HEP as symptoms allow.      Plan: Continue per plan of care.  Progress treatment as tolerated.       Diagnosis: s/p (L) medial unicompartmental knee arthroplasty 3/4/24   Precautions: Diabetic, HTN (controlled w/ meds), skin cancer   POC Expires: 24   Re-evaluation Date: 24   FOTO Scores/Date: Goal - 77; 3/21 -    Visit Count 6/10 7/10 8/10 4/10 5/10   Manuals 4/10 4/12 4/17 4/3 4   L knee PROM all planes per tolerance        Patellar mobs KD       STM medial aspect of knee KD    VR           Ther Ex 4/10 4/12 4/17  "4/3 4/5   Bike L2 x 5 min L2 x 5 min L2 x 5 min L1 x 5 min L1 x5min   Supine hip flexor/quad stretch 3x30\"  3x30\"  3x30\"  3x30\"  3x30\"   Lateral band walk 4 laps blue 4 laps blue 4 laps blue 4 laps blue 4 laps blue   SLS 3x20-30\"  Neuro Neuro 5x10\"     SL leg press 65# 3x10 65# 3x10 75# 3x10 55# 2x10 55# 2x10                                   Pt education KD KD KD KD    HEP Updated  Updated  Updated  Updated     Neuro Re-Ed  4/12 4/17     Standing hip 3-way  Grn 20x ea bilaterally Grn 20x ea bilaterally     SLS  W/ 3# DB pass 10x W/ 3# DB pass 10x     SL cone   3 cones to low mat; 3x 3 cones to low mat; 3x     Resisted lateral walking at [place]  12# 4 laps ea 15# 4 laps ea                            Ther Act 4/10  4/12 4/17       Sit to stand 20# 2x10 25# 2x10 25# 2x10     Step ups 8\" box 2x10 ea (fwd/lat) 8\" box 2x10 ea (fwd/lat) 10\" box 2x10 ea (fwd/lat)     Static lunges 2x10 ea 2x10 ea 2x10 ea     Step downs   6\" 2x10                                         Modalities                                                        "

## 2024-04-19 ENCOUNTER — EVALUATION (OUTPATIENT)
Dept: PHYSICAL THERAPY | Facility: CLINIC | Age: 64
End: 2024-04-19
Payer: COMMERCIAL

## 2024-04-19 DIAGNOSIS — Z96.652 STATUS POST TOTAL KNEE REPLACEMENT, LEFT: Primary | ICD-10-CM

## 2024-04-19 PROCEDURE — 97110 THERAPEUTIC EXERCISES: CPT

## 2024-04-19 PROCEDURE — 97112 NEUROMUSCULAR REEDUCATION: CPT

## 2024-04-19 NOTE — PROGRESS NOTES
PT Re-Evaluation     Today's date: 2024  Patient name: Balta Monson  : 1960  MRN: [id number]  Referring provider: Kyle Farrar, *  Dx:   Encounter Diagnosis     ICD-10-CM    1. Status post medial unicompartmental knee replacement, left  Z96.652             Start Time: 0845  Stop Time: 09  Total time in clinic (min): 40 minutes    Assessment  Assessment details: Patient is a 62 y/o male who presents to therapy s/p left medial unicompartmental knee arthroplasty and has completed 9 visits to date with improved FOTO score of 59 since IE. Patient demonstrates subjective/objective improvement since starting PT such as improved range of motion, strength, gait, and activity tolerance. Patient continues to present with deficits that include knee strength, difficulty with stairs, and pain with standing up from a seated position. Patient will benefit from continued skilled PT intervention to address the aforementioned impairments, achieve goals, maximize function, and improve quality of life. Pt is in agreement with this plan.   Impairments: abnormal gait, abnormal or restricted ROM, activity intolerance, impaired balance, impaired physical strength, lacks appropriate home exercise program and pain with function    Goals  ST weeks  Pt will demonstrate good understanding and compliance with HEP --progressing  Pt will increase active left knee extension to 0* to normalize gait with TKE at IC phase of gait --progressing  Pt will increase active left knee flexion to 120* to normalize gait and improve tolerance/ability to perform functional activities to include stairs and squatting --progressing  Pt will decrease left knee pain to 2-3/10 with activity  --progressing    LT weeks  Pt will increase left hip/knee strength to 5-/5 to allow for improved ability to squat, negotiate stairs, and prolonged community ambulation --progressing  Pt will decrease left knee pain to 0-1/10 with activity  --progressing  Pt will demonstrate negative Elys test on left --progressing  Pt will ambulate with least restrictive AD and normal gait mechanics --progressing   Pt will exhibit proper squatting/lifting mechanics without reliance on external cues for correction of form --progressing  Pt will be able to tolerate prolonged standing/walking activities > 30 minutes without provocation of knee pain  --progressing  Pt will improve FOTO score to > or = to 77 to indicate improved functional abilities --progressing      Plan  Patient would benefit from: PT eval and skilled physical therapy  Planned modality interventions: thermotherapy: hydrocollator packs and cryotherapy  Planned therapy interventions: ADL training, activity modification, joint mobilization, manual therapy, neuromuscular re-education, patient education, postural training, self care, strengthening, stretching, therapeutic activities, therapeutic exercise, home exercise program, graded exercise, graded activity, gait training, functional ROM exercises, flexibility, body mechanics training and balance  Frequency: 2x week  Duration in weeks: 8  Plan of Care beginning date: 3/21/2024  Plan of Care expiration date: 5/16/2024  Treatment plan discussed with: patient        Subjective Evaluation    History of Present Illness  Mechanism of injury: RE (4/19/24):  Pt reports he thinks the knee is coming along very well. He reports pain is almost gone, but he does get pain sometimes in the front of the knee. He gets pin when standing up from a seated position. He reports improvements in flexion, putting his shoes on, and getting his socks on. He notes he still has difficulty with getting into bed since he has a higher bed at home. Pt notes 80-85% improvement since starting therapy.     IE (3/21/24):  Pt reports to PT with s/p left medial unicompartmental knee arthroplasty 3/4/24. Pt had MD follow up 2 days ago with good report from surgeon and instructed to initiate PT.  Pt reports hx of knee pain for years and opted for surgical intervention. Pt was using a cane for ambulation up until 2 days ago but has since weaned from this and now walking unassisted, pt denies buckling episodes or falls. Pt denies shortness of breath/fevers. Pt admits to some lingering numbness along outer aspect of knee. 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 Pt reports pain with pivoting/twisting, prolonged standing/walking, squatting, stairs, sleeping at night, as well as stiffness in the AM.     Aggravating factors: pivoting/twisting, prolonged standing/walking, squatting, stairs, sleeping at night, as well as stiffness in the AM. Pt admits to continued swelling and feels this is worse at the end of the day.     Easing Factors:  Rest, ice, NSAIDs    PLOF:  Hx of knee pain for years, was walking unassisted prior to surgery    PMH: Diabetic, HTN (controlled w/ meds), skin cancer  Patient Goals  Patient goals for therapy: increased motion, decreased edema, increased strength, decreased pain, improved balance, return to sport/leisure activities, independence with ADLs/IADLs and return to work    Pain  Current pain ratin  At best pain ratin  At worst pain ratin  Quality: dull ache and sharp          Objective     General Comments:      Knee Comments  Left knee AROM: 0-120*    Left knee MMT:  Flex- 4+/5 ext- 4+/5      Gait Assessment: Pt ambulates unassisted with slight antalgic gait, maintains slight knee flexion throughout gait cycle     Light touch intact and symmetrical bilateral Les    SLS:   L - 12 seconds    FOTO: 59        Flowsheet Rows      Flowsheet Row Most Recent Value   PT/OT G-Codes    Current Score 57   Projected Score 77               Diagnosis: s/p (L) medial unicompartmental knee arthroplasty 3/4/24   Precautions: Diabetic, HTN (controlled w/ meds), skin cancer   POC Expires: 24   Re-evaluation Date:  "5/16/24    FOTO Scores/Date: Goal - 77; 3/21 - 57; 4/19-59    Visit Count 6/10 7/10 8/10 1/10 5/10   Manuals 4/10 4/12 4/17 4/19 4/5   L knee PROM all planes per tolerance        Patellar mobs KD       STM medial aspect of knee KD    VR           Ther Ex 4/10 4/12 4/17 4/19 4/5   Bike L2 x 5 min L2 x 5 min L2 x 5 min L2 x 5 min  L1 x5min   Supine hip flexor/quad stretch 3x30\"  3x30\"  3x30\"  3x30\"  3x30\"   Lateral band walk 4 laps blue 4 laps blue 4 laps blue 4 laps blue  4 laps blue   SLS 3x20-30\"  Neuro Neuro     SL leg press 65# 3x10 65# 3x10 75# 3x10 75# 3x10  55# 2x10                               Updated FOTO/measurements     Pt education KD KD KD     HEP Updated  Updated  Updated      Neuro Re-Ed  4/12 4/17 4/19    Standing hip 3-way  Grn 20x ea bilaterally Grn 20x ea bilaterally Grn 20x ea bilaterally     SLS  W/ 3# DB pass 10x W/ 3# DB pass 10x     SL cone   3 cones to low mat; 3x 3 cones to low mat; 3x     Resisted lateral walking at [place]  12# 4 laps ea 15# 4 laps ea                           Ther Act 4/10  4/12 4/17      Sit to stand 20# 2x10 25# 2x10 25# 2x10     Step ups 8\" box 2x10 ea (fwd/lat) 8\" box 2x10 ea (fwd/lat) 10\" box 2x10 ea (fwd/lat)     Static lunges 2x10 ea 2x10 ea 2x10 ea     Step downs   6\" 2x10                                         Modalities                                            "

## 2024-04-23 ENCOUNTER — OFFICE VISIT (OUTPATIENT)
Dept: PHYSICAL THERAPY | Facility: CLINIC | Age: 64
End: 2024-04-23
Payer: COMMERCIAL

## 2024-04-23 DIAGNOSIS — Z96.652 STATUS POST TOTAL KNEE REPLACEMENT, LEFT: Primary | ICD-10-CM

## 2024-04-23 PROCEDURE — 97112 NEUROMUSCULAR REEDUCATION: CPT

## 2024-04-23 PROCEDURE — 97110 THERAPEUTIC EXERCISES: CPT

## 2024-04-23 PROCEDURE — 97530 THERAPEUTIC ACTIVITIES: CPT

## 2024-04-23 NOTE — PROGRESS NOTES
"Daily Note     Today's date: 2024  Patient name: Balta Monson  : 1960  MRN: [id number]  Referring provider: Kyle Farrar, *  Dx:   Encounter Diagnosis     ICD-10-CM    1. Status post medial unicompartmental knee replacement, left  Z96.652           Start Time: 1500  Stop Time: 1558  Total time in clinic (min): 58 minutes    Subjective: patient reports no new complaints or incidents.  Denies any increased pain since last therapy session.      Objective: See treatment diary below      Assessment: patient was able to complete therapy with in tolerance and without presence of reported pain.  Reports being challenged by normal fatigue and weakness.  Was able to perform sit to  greater ROM without load requiring cues on weight distribution and feet positioning.  Patient is challenged under SLS requiring frequent OP LE tapping.  Cues on exercise sequencing required throughout      Plan: Continue per plan of care.  Progress treatment as tolerated.       Diagnosis: s/p (L) medial unicompartmental knee arthroplasty 3/4/24   Precautions: Diabetic, HTN (controlled w/ meds), skin cancer   POC Expires: 24   Re-evaluation Date: 24    FOTO Scores/Date: Goal - 77; 3/21 - 57; -    Visit Count 6/10 7/10 8/10 1/10 2/10   Manuals 4/10 4/12 4/17 4/19 4/23   L knee PROM all planes per tolerance        Patellar mobs KD       STM medial aspect of knee KD               Ther Ex 4/10 4/12 4/17 4/19 4/23           Bike L2 x 5 min L2 x 5 min L2 x 5 min L2 x 5 min  L2 x 6 mins    Supine hip flexor/quad stretch 3x30\"  3x30\"  3x30\"  3x30\"  30\"x3    Lateral band walk 4 laps blue 4 laps blue 4 laps blue 4 laps blue  BTB x 4 laps    SLS 3x20-30\"  Neuro Neuro     SL leg press 65# 3x10 65# 3x10 75# 3x10 75# 3x10                                 Updated FOTO/measurements     Pt education KD KD KD     HEP Updated  Updated  Updated      Neuro Re-Ed     Standing hip 3-way  Grn 20x ea " "bilaterally Grn 20x ea bilaterally Grn 20x ea bilaterally  GTB x20 ea BL    SLS  W/ 3# DB pass 10x W/ 3# DB pass 10x  W/3# DB pass x10    SL cone   3 cones to low mat; 3x 3 cones to low mat; 3x  3 cones to low mat x 3   Resisted lateral walking at [place]  12# 4 laps ea 15# 4 laps ea  15# x 4 laps ea                        Ther Act 4/10  4/12 4/17  4/23   Sit to stand 20# 2x10 25# 2x10 25# 2x10  To 18\"  2x10    Step ups 8\" box 2x10 ea (fwd/lat) 8\" box 2x10 ea (fwd/lat) 10\" box 2x10 ea (fwd/lat)  Fwd/lat 10\" 2x10 ea    Static lunges 2x10 ea 2x10 ea 2x10 ea  2x10  ea   Step downs   6\" 2x10  6\" 2x10                                      Modalities                                           "

## 2024-04-25 ENCOUNTER — APPOINTMENT (OUTPATIENT)
Dept: PHYSICAL THERAPY | Facility: CLINIC | Age: 64
End: 2024-04-25
Payer: COMMERCIAL

## 2024-05-01 ENCOUNTER — OFFICE VISIT (OUTPATIENT)
Dept: PHYSICAL THERAPY | Facility: CLINIC | Age: 64
End: 2024-05-01
Payer: COMMERCIAL

## 2024-05-01 DIAGNOSIS — Z96.652 STATUS POST TOTAL KNEE REPLACEMENT, LEFT: Primary | ICD-10-CM

## 2024-05-01 PROCEDURE — 97110 THERAPEUTIC EXERCISES: CPT

## 2024-05-01 PROCEDURE — 97112 NEUROMUSCULAR REEDUCATION: CPT

## 2024-05-01 PROCEDURE — 97530 THERAPEUTIC ACTIVITIES: CPT

## 2024-05-01 NOTE — PROGRESS NOTES
"Daily Note     Today's date: 2024  Patient name: Balta Monson  : 1960  MRN: [id number]  Referring provider: Kyle Farrar, *  Dx:   Encounter Diagnosis     ICD-10-CM    1. Status post medial unicompartmental knee replacement, left  Z96.652           Start Time: 846  Stop Time: 933  Total time in clinic (min): 47 minutes    Subjective: patient reports no new complaints or incidents.  Denies any increase to knee pain.  Denies any instability or buckling      Objective: See treatment diary below      Assessment: patient was able to complete therapy with good tolerance and without presence of pain.  Demonstrates improved control with squatting in greater ROM with min R>L weight distribution that improves with cues.  Was able to perform step downs in greater ROM with use of UE assist.    Plan: Continue per plan of care.  Progress treatment as tolerated.       Diagnosis: s/p (L) medial unicompartmental knee arthroplasty 3/4/24   Precautions: Diabetic, HTN (controlled w/ meds), skin cancer   POC Expires: 24   Re-evaluation Date: 24    FOTO Scores/Date: Goal - 77; 3/21 - 57; -    Visit Count 3/10 7/10 8/10 1/10 2/10   Manuals    L knee PROM all planes per tolerance        Patellar mobs        STM medial aspect of knee                Ther Ex            Bike L2 x 6 mins L2 x 5 min L2 x 5 min L2 x 5 min  L2 x 6 mins    Supine hip flexor/quad stretch 30\"x3  3x30\"  3x30\"  3x30\"  30\"x3    Lateral band walk BTB x 4 laps  4 laps blue 4 laps blue 4 laps blue  BTB x 4 laps    SLS  Neuro Neuro     SL leg press SL 85# 3x10  65# 3x10 75# 3x10 75# 3x10                                 Updated FOTO/measurements     Pt education  KD KD     HEP  Updated  Updated      Neuro Re-Ed     Standing hip 3-way GTB x20 ea BL  Grn 20x ea bilaterally Grn 20x ea bilaterally Grn 20x ea bilaterally  GTB x20 ea BL    SLS W/5# KB pass x10  W/ 3# DB pass " "10x W/ 3# DB pass 10x  W/3# DB pass x10    SL cone  3 cones to low mat x 3  3 cones to low mat; 3x 3 cones to low mat; 3x  3 cones to low mat x 3   Resisted lateral walking at [place] 18# x 4 laps  12# 4 laps ea 15# 4 laps ea  15# x 4 laps ea                       Ther Act   4/12 4/17 4/23   Sit to stand To 18\" 2x10  25# 2x10 25# 2x10  To 18\"  2x10    Step ups Fwd/lat 10# 2x10  8\" box 2x10 ea (fwd/lat) 10\" box 2x10 ea (fwd/lat)  Fwd/lat 10\" 2x10 ea    Static lunges 2x10 ea  2x10 ea 2x10 ea  2x10  ea   Step downs 8\" 2x10   6\" 2x10  6\" 2x10                                    Modalities                                           "

## 2024-05-03 ENCOUNTER — OFFICE VISIT (OUTPATIENT)
Dept: PHYSICAL THERAPY | Facility: CLINIC | Age: 64
End: 2024-05-03
Payer: COMMERCIAL

## 2024-05-03 DIAGNOSIS — Z96.652 STATUS POST TOTAL KNEE REPLACEMENT, LEFT: Primary | ICD-10-CM

## 2024-05-03 PROCEDURE — 97530 THERAPEUTIC ACTIVITIES: CPT

## 2024-05-03 PROCEDURE — 97112 NEUROMUSCULAR REEDUCATION: CPT

## 2024-05-03 PROCEDURE — 97110 THERAPEUTIC EXERCISES: CPT

## 2024-05-03 NOTE — PROGRESS NOTES
"Daily Note     Today's date: 5/3/2024  Patient name: Balta Monson  : 1960  MRN: [id number]  Referring provider: Kyle Farrar, *  Dx:   Encounter Diagnosis     ICD-10-CM    1. Status post medial unicompartmental knee replacement, left  Z96.652           Start Time: 0845  Stop Time: 09  Total time in clinic (min): 47 minutes    Subjective: patient reports no new complaints or incidents.  Denies any increased pain since last therapy session      Objective: See treatment diary below      Assessment: patient demonstrates improved control with sit to stand and fwd step ups in increased ROM.  Was able to perform cone reaching in greater ROM with fair control.  Normal fatigue without presence of pain post reported.    Plan: Continue per plan of care.  Progress treatment as tolerated.       Diagnosis: s/p (L) medial unicompartmental knee arthroplasty 3/4/24   Precautions: Diabetic, HTN (controlled w/ meds), skin cancer   POC Expires: 24   Re-evaluation Date: 24    FOTO Scores/Date: Goal - 77; 3/21 - 57; -    Visit Count 3/10 4/10 8/10 1/10 2/10   Manuals 5/1 5/3 4/17 4/19 4/23   L knee PROM all planes per tolerance        Patellar mobs        STM medial aspect of knee                Ther Ex 5/1 5/3 4/17 4/19 4/23           Bike L2 x 6 mins L2 x 6 mins L2 x 5 min L2 x 5 min  L2 x 6 mins    Supine hip flexor/quad stretch 30\"x3  30\"x3 3x30\"  3x30\"  30\"x3    Lateral band walk BTB x 4 laps  BTB x 4 laps  4 laps blue 4 laps blue  BTB x 4 laps    SLS   Neuro     SL leg press SL 85# 3x10   75# 3x10 75# 3x10                                 Updated FOTO/measurements     Pt education   KD     HEP   Updated      Neuro Re-Ed  5/3 4/17 4/19 4/23   Standing hip 3-way GTB x20 ea BL  BTB x 20 ea BL  Grn 20x ea bilaterally Grn 20x ea bilaterally  GTB x20 ea BL    SLS W/5# KB pass x10  W/KB pass x10  W/ 3# DB pass 10x  W/3# DB pass x10    SL cone  3 cones to low mat x 3  3 cones to 12\" box x 3 3 cones " "to low mat; 3x  3 cones to low mat x 3   Resisted lateral walking at [place] 18# x 4 laps  18# x 4 laps ea  15# 4 laps ea  15# x 4 laps ea                      Ther Act  5/3 4/17  4/23   Sit to stand To 18\" 2x10  To 18\" 2x10  25# 2x10  To 18\"  2x10    Step ups Fwd/lat 10# 2x10  Fwd/Lat 10\" 2x10 ea 10\" box 2x10 ea (fwd/lat)  Fwd/lat 10\" 2x10 ea    Static lunges 2x10 ea  2x10 ea  2x10 ea  2x10  ea   Step downs 8\" 2x10   6\" 2x10  6\" 2x10                                  Modalities                                            "

## 2024-05-07 ENCOUNTER — OFFICE VISIT (OUTPATIENT)
Dept: PHYSICAL THERAPY | Facility: CLINIC | Age: 64
End: 2024-05-07
Payer: COMMERCIAL

## 2024-05-07 DIAGNOSIS — Z96.652 STATUS POST TOTAL KNEE REPLACEMENT, LEFT: Primary | ICD-10-CM

## 2024-05-07 PROCEDURE — 97112 NEUROMUSCULAR REEDUCATION: CPT

## 2024-05-07 PROCEDURE — 97530 THERAPEUTIC ACTIVITIES: CPT

## 2024-05-07 PROCEDURE — 97110 THERAPEUTIC EXERCISES: CPT

## 2024-05-07 NOTE — PROGRESS NOTES
"Daily Note     Today's date: 2024  Patient name: Balta Monson  : 1960  MRN: [id number]  Referring provider: Kyle Farrar, *  Dx:   Encounter Diagnosis     ICD-10-CM    1. Status post medial unicompartmental knee replacement, left  Z96.652           Start Time: 1500  Stop Time: 1544  Total time in clinic (min): 44 minutes    Subjective: patient reports no new complaints or incidents.  Denies increased pain since last therapy session.  Reports continued improvement.       Objective: See treatment diary below      Assessment: patient was able to complete therapy with in tolerance and without onset of reported pain.  Demonstrates good control with stepping squatting and lunging activities requiring min use of UE support.  Small cues on sequencing of exercises but demonstrates good carryover from previous therapy session requiring little cues on corrective mechanics.    Plan: Continue per plan of care.  Progress treatment as tolerated.       Diagnosis: s/p (L) medial unicompartmental knee arthroplasty 3/4/24   Precautions: Diabetic, HTN (controlled w/ meds), skin cancer   POC Expires: 24   Re-evaluation Date: 24    FOTO Scores/Date: Goal - 77; 3/21 - 57; -    Visit Count 3/10 4/10 5/10 1/10 2/10   Manuals 5/1 5/3 5/7 4/19 4/23   L knee PROM all planes per tolerance        Patellar mobs        STM medial aspect of knee                Ther Ex 5/1 5/3 5/7 4/19 4/23           Bike L2 x 6 mins L2 x 6 mins L3 x 6 mins L2 x 5 min  L2 x 6 mins    Supine hip flexor/quad stretch 30\"x3  30\"x3 30\"x3 3x30\"  30\"x3    Lateral band walk BTB x 4 laps  BTB x 4 laps   4 laps blue  BTB x 4 laps    SLS        SL leg press SL 85# 3x10    75# 3x10                                 Updated FOTO/measurements     Pt education        HEP        Neuro Re-Ed  5/3 5/7 4/19 4/23   Standing hip 3-way GTB x20 ea BL  BTB x 20 ea BL  BTB x 20 ea BL  Grn 20x ea bilaterally  GTB x20 ea BL    SLS W/5# KB pass x10  W/KB " "pass x10  W/KB pass x10   W/3# DB pass x10    SL cone  3 cones to low mat x 3  3 cones to 12\" box x 3 3 cones to 12\" box x 3   3 cones to low mat x 3   Resisted lateral walking at [place] 18# x 4 laps  18# x 4 laps ea  20# x 4 laps ea   15# x 4 laps ea                     Ther Act  5/3 5/7  4/23   Sit to stand To 18\" 2x10  To 18\" 2x10  To 18\" 2x10   To 18\"  2x10    Step ups Fwd/lat 10# 2x10  Fwd/Lat 10\" 2x10 ea Fwd/lat 10# 2x10 ea   Fwd/lat 10\" 2x10 ea    Static lunges 2x10 ea  2x10 ea  2x10 ea   2x10  ea   Step downs 8\" 2x10     6\" 2x10                                Modalities                                             "

## 2024-05-09 ENCOUNTER — APPOINTMENT (OUTPATIENT)
Dept: PHYSICAL THERAPY | Facility: CLINIC | Age: 64
End: 2024-05-09
Payer: COMMERCIAL

## 2024-05-15 ENCOUNTER — OFFICE VISIT (OUTPATIENT)
Dept: PHYSICAL THERAPY | Facility: CLINIC | Age: 64
End: 2024-05-15
Payer: COMMERCIAL

## 2024-05-15 DIAGNOSIS — Z96.652 STATUS POST TOTAL KNEE REPLACEMENT, LEFT: Primary | ICD-10-CM

## 2024-05-15 PROCEDURE — 97530 THERAPEUTIC ACTIVITIES: CPT

## 2024-05-15 PROCEDURE — 97112 NEUROMUSCULAR REEDUCATION: CPT

## 2024-05-15 PROCEDURE — 97110 THERAPEUTIC EXERCISES: CPT

## 2024-05-15 NOTE — PROGRESS NOTES
"Daily Note     Today's date: 5/15/2024  Patient name: Balta Monson  : 1960  MRN: [id number]  Referring provider: Kyle Farrar, *  Dx: No diagnosis found.    Start Time: 930  Stop Time: 1018  Total time in clinic (min): 48 minutes    Subjective: patient reports no new complaints or incidents.  Reports continued improvement.  Denies any knee pain.        Objective: See treatment diary below      Assessment: patient was able to progress in therapy further challenging stepping and squatting activities performing both with 20# load without compromise to control reporting only normal fatigue requiring rest periods due to fatigue.  Demonstrates good control under SLS reaching out of COG with little to no OP LE assist  Plan: Continue per plan of care.  Progress treatment as tolerated.       Diagnosis: s/p (L) medial unicompartmental knee arthroplasty 3/4/24   Precautions: Diabetic, HTN (controlled w/ meds), skin cancer   POC Expires: 24   Re-evaluation Date: 24    FOTO Scores/Date: Goal - 77; 3/21 - 57; -    Visit Count 3/10 4/10 5/10 6/10 2/10   Manuals 5/1 5/3 5/7 5/15 4/23   L knee PROM all planes per tolerance        Patellar mobs        STM medial aspect of knee                Ther Ex 5/1 5/3 5/7 5/15 4/23           Bike L2 x 6 mins L2 x 6 mins L3 x 6 mins L3 x 6 mins L2 x 6 mins    Supine hip flexor/quad stretch 30\"x3  30\"x3 30\"x3 30\"x 3 30\"x3    Lateral band walk BTB x 4 laps  BTB x 4 laps    BTB x 4 laps    SLS        SL leg press SL 85# 3x10                                        Pt education        HEP        Neuro Re-Ed  5/3 5/7 5/15 4/23   Standing hip 3-way GTB x20 ea BL  BTB x 20 ea BL  BTB x 20 ea BL  BTB x20 ea BL  GTB x20 ea BL    SLS W/5# KB pass x10  W/KB pass x10  W/KB pass x10  W/ 7.5# KB pass x10 W/3# DB pass x10    SL cone  3 cones to low mat x 3  3 cones to 12\" box x 3 3 cones to 12\" box x 3  3 cones to 12\" box  3 cones to low mat x 3   Resisted lateral " "walking at [place] 18# x 4 laps  18# x 4 laps ea  20# x 4 laps ea  20# x 4 laps ea  15# x 4 laps ea                     Ther Act  5/3 5/7 5/15 4/23   Sit to stand To 18\" 2x10  To 18\" 2x10  To 18\" 2x10  To 18\" 20# 2x10  To 18\"  2x10    Step ups Fwd/lat 10# 2x10  Fwd/Lat 10\" 2x10 ea Fwd/lat 10# 2x10 ea  Fwd/Lat 10\" 20# 2x10 ea Fwd/lat 10\" 2x10 ea    Static lunges 2x10 ea  2x10 ea  2x10 ea  2x10 2x10  ea   Step downs 8\" 2x10     6\" 2x10                              Modalities                                             "

## 2024-05-17 ENCOUNTER — OFFICE VISIT (OUTPATIENT)
Dept: PHYSICAL THERAPY | Facility: CLINIC | Age: 64
End: 2024-05-17
Payer: COMMERCIAL

## 2024-05-17 DIAGNOSIS — Z96.652 STATUS POST TOTAL KNEE REPLACEMENT, LEFT: Primary | ICD-10-CM

## 2024-05-17 PROCEDURE — 97110 THERAPEUTIC EXERCISES: CPT | Performed by: PHYSICAL THERAPIST

## 2024-05-17 NOTE — PROGRESS NOTES
PT Re-Evaluation  and PT Discharge    Today's date: 2024  Patient name: Balta Monson  : 1960  MRN: [id number]  Referring provider: Kyle Farrar, *  Dx:   Encounter Diagnosis     ICD-10-CM    1. Status post medial unicompartmental knee replacement, left  Z96.652             Start Time: 0845  Stop Time: 925  Total time in clinic (min): 40 minutes    Assessment    Assessment details: Pt has been seen for 15 visits and has made excellent subjective/objective improvements since enrolling in therapy with improved FOTO score from 57 to 99 since initial evaluation. Pt has returned all normal ADLs and recreational activities without pain or limitation and is appropriate for discharge to home exercise program at this time. Pt encouraged to continue with HEP on regular basis per tolerance and was educated on how to progress/regress exercises per symptoms/tolerance. Pt is in agreement with plan.      Goals  ST weeks  Pt will demonstrate good understanding and compliance with HEP --MET  Pt will increase active left knee extension to 0* to normalize gait with TKE at IC phase of gait --MET  Pt will increase active left knee flexion to 120* to normalize gait and improve tolerance/ability to perform functional activities to include stairs and squatting --MET  Pt will decrease left knee pain to 2-3/10 with activity  --MET    LT weeks  Pt will increase left hip/knee strength to 5-/5 to allow for improved ability to squat, negotiate stairs, and prolonged community ambulation --MET  Pt will decrease left knee pain to 0-1/10 with activity --MET  Pt will demonstrate negative [place] test on left --NOT MET  Pt will ambulate with least restrictive AD and normal gait mechanics --MET   Pt will exhibit proper squatting/lifting mechanics without reliance on external cues for correction of form --MET  Pt will be able to tolerate prolonged standing/walking activities > 30 minutes without provocation of knee pain   --MET  Pt will improve FOTO score to > or = to 77 to indicate improved functional abilities --MET      Plan  Patient would benefit from: PT eval and skilled physical therapy  Planned modality interventions: thermotherapy: hydrocollator packs and cryotherapy    Planned therapy interventions: ADL training, activity modification, joint mobilization, manual therapy, neuromuscular re-education, patient education, postural training, self care, strengthening, stretching, therapeutic activities, therapeutic exercise, home exercise program, graded exercise, graded activity, gait training, functional ROM exercises, flexibility, body mechanics training and balance    Plan of Care beginning date: 5/17/2024  Plan of Care expiration date: 5/17/2024  Treatment plan discussed with: patient  Plan details: Discharge to [facility]        Subjective Evaluation    History of Present Illness  Mechanism of injury: DISCHARGE:   Pt has completed 15 visits to date and overall reports significant improvement in knee pain, mobility, and strength. Pt admits to fully returning to all normal ADLs without pain or limitation. Pt feels he is ready to transition to a home exercise program at this time.     RE (4/19/24):  Pt reports he thinks the knee is coming along very well. He reports pain is almost gone, but he does get pain sometimes in the front of the knee. He gets pin when standing up from a seated position. He reports improvements in flexion, putting his shoes on, and getting his socks on. He notes he still has difficulty with getting into bed since he has a higher bed at home. Pt notes 80-85% improvement since starting therapy.     IE (3/21/24):  Pt reports to PT with s/p left medial unicompartmental knee arthroplasty 3/4/24. Pt had MD follow up 2 days ago with good report from surgeon and instructed to initiate PT. Pt reports hx of knee pain for years and opted for surgical intervention. Pt was using a cane for ambulation up until 2 days ago but  "has since weaned from this and now walking unassisted, pt denies buckling episodes or falls. Pt denies shortness of breath/fevers. Pt admits to some lingering numbness along outer aspect of knee. 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 Pt reports pain with pivoting/twisting, prolonged standing/walking, squatting, stairs, sleeping at night, as well as stiffness in the AM.     Aggravating factors: pivoting/twisting, prolonged standing/walking, squatting, stairs, sleeping at night, as well as stiffness in the AM. Pt admits to continued swelling and feels this is worse at the end of the day.     Easing Factors:  Rest, ice, NSAIDs    PLOF:  Hx of knee pain for years, was walking unassisted prior to surgery    PMH: Diabetic, HTN (controlled w/ meds), skin cancer  Pain  Current pain ratin  At best pain ratin  At worst pain ratin  Quality: dull ache          Objective     General Comments:      Knee Comments  Left knee AROM: 0-121*    Left hip MMT: Flex 5-/5 Abd 5-/5 Ext 5/5    Left knee MMT:  Flex- 5/5 ext- 5/5      Gait Assessment: Pt ambulates unassisted with normal gait mechanics     Light touch intact and symmetrical bilateral LE    SLS:   L - 20 seconds with moderate hip/ankle strategy     FOTO: 99 (improved from 59 at IE)                 Diagnosis: s/p (L) medial unicompartmental knee arthroplasty 3/4/24   Precautions: Diabetic, HTN (controlled w/ meds), skin cancer   POC Expires: 24   Re-evaluation Date: 24    FOTO Scores/Date: Goal - 77; 3/21 - 57; -59;  -    Visit Count 3/10 4/10 5/10 6/10 1/10   Manuals 5/1 5/3 5/7 5/15 5/17   L knee PROM all planes per tolerance        Patellar mobs        STM medial aspect of knee                Ther Ex 5/1 5/3 5/7 5/15 5/17           Bike L2 x 6 mins L2 x 6 mins L3 x 6 mins L3 x 6 mins L3 x 6 mins    Supine hip flexor/quad stretch 30\"x3  30\"x3 30\"x3 30\"x 3    Lateral band " "walk BTB x 4 laps  BTB x 4 laps       SLS        SL leg press SL 85# 3x10                                        Pt education        HEP     Re-assessed left knee and FOTO; HEP creation/instruction and educated on importance of continued compliance, how to progress/regress as needed    Neuro Re-Ed  5/3 5/7 5/15 5/17   Standing hip 3-way GTB x20 ea BL  BTB x 20 ea BL  BTB x 20 ea BL  BTB x20 ea BL     SLS W/5# KB pass x10  W/KB pass x10  W/KB pass x10  W/ 7.5# KB pass x10    SL cone  3 cones to low mat x 3  3 cones to 12\" box x 3 3 cones to 12\" box x 3  3 cones to 12\" box     Resisted lateral walking at [place] 18# x 4 laps  18# x 4 laps ea  20# x 4 laps ea  20# x 4 laps ea                       Ther Act  5/3 5/7 5/15 5/17   Sit to stand To 18\" 2x10  To 18\" 2x10  To 18\" 2x10  To 18\" 20# 2x10     Step ups Fwd/lat 10# 2x10  Fwd/Lat 10\" 2x10 ea Fwd/lat 10# 2x10 ea  Fwd/Lat 10\" 20# 2x10 ea    Static lunges 2x10 ea  2x10 ea  2x10 ea  2x10    Step downs 8\" 2x10                                  Modalities                                     "

## 2024-05-21 ENCOUNTER — APPOINTMENT (OUTPATIENT)
Dept: PHYSICAL THERAPY | Facility: CLINIC | Age: 64
End: 2024-05-21
Payer: COMMERCIAL

## 2024-05-23 ENCOUNTER — APPOINTMENT (OUTPATIENT)
Dept: PHYSICAL THERAPY | Facility: CLINIC | Age: 64
End: 2024-05-23
Payer: COMMERCIAL

## 2024-05-28 ENCOUNTER — OFFICE VISIT (OUTPATIENT)
Dept: OBGYN CLINIC | Facility: MEDICAL CENTER | Age: 64
End: 2024-05-28

## 2024-05-28 VITALS
WEIGHT: 245 LBS | BODY MASS INDEX: 36.29 KG/M2 | SYSTOLIC BLOOD PRESSURE: 115 MMHG | HEART RATE: 71 BPM | DIASTOLIC BLOOD PRESSURE: 75 MMHG | HEIGHT: 69 IN

## 2024-05-28 DIAGNOSIS — Z96.652 STATUS POST TOTAL KNEE REPLACEMENT, LEFT: Primary | ICD-10-CM

## 2024-05-28 PROCEDURE — 99024 POSTOP FOLLOW-UP VISIT: CPT | Performed by: STUDENT IN AN ORGANIZED HEALTH CARE EDUCATION/TRAINING PROGRAM

## 2024-05-28 NOTE — PROGRESS NOTES
Subjective: 63 y.o. male presents to the office 3 months s/p left medial unicompartmental knee arthroplasty performed on 03/04/2024. Patient seen and examined. Denies pain. Progressing well and has returned to normal activities. Incision without drainage. Denies fevers or chills. He does note some calf soreness over the weekend with walking in slippers, but has been improving.    Physical Exam:  Incision: well healed  ROM: 0-120 without pain  5/5 IP/Q/HS/TA/GS, 2+ DP/PT, SILT DP/SP/S/S/TN    No new imaging obtained today    Assessment/Plan:  3 months s/p left medial unicompartmental knee arthroplasty performed on 03/04/2024     - continue multi-modal pain control   - Weight bearing status: as tolerated  - DVT ppx: completed  - Continue PT/OT exercises  - May return to activities as tolerated  - F/U in 9 months for annual check. New x-rays upon arrival.    Scribe Attestation      I,:   am acting as a scribe while in the presence of the attending physician.:       I,:   personally performed the services described in this documentation    as scribed in my presence.:

## 2025-02-24 ENCOUNTER — APPOINTMENT (OUTPATIENT)
Age: 65
End: 2025-02-24
Payer: COMMERCIAL

## 2025-02-24 ENCOUNTER — OFFICE VISIT (OUTPATIENT)
Age: 65
End: 2025-02-24
Payer: COMMERCIAL

## 2025-02-24 VITALS — BODY MASS INDEX: 36.29 KG/M2 | HEIGHT: 69 IN | WEIGHT: 245 LBS

## 2025-02-24 DIAGNOSIS — Z96.652 STATUS POST TOTAL KNEE REPLACEMENT, LEFT: Primary | ICD-10-CM

## 2025-02-24 DIAGNOSIS — Z96.652 STATUS POST TOTAL KNEE REPLACEMENT, LEFT: ICD-10-CM

## 2025-02-24 PROCEDURE — 73562 X-RAY EXAM OF KNEE 3: CPT

## 2025-02-24 PROCEDURE — 99213 OFFICE O/P EST LOW 20 MIN: CPT | Performed by: STUDENT IN AN ORGANIZED HEALTH CARE EDUCATION/TRAINING PROGRAM

## 2025-02-24 NOTE — PROGRESS NOTES
Subjective: Patient is a 63-year-old male in office for 11-months status post left medial unicompartmental knee arthroplasty performed on 3/4/24.  Patient seen and examined. No pain. Incision well-healed. Patient states he is overall pleased with his results.    Physical Exam:  Incision: well healed  ROM: 0-125 without pain  5/5 IP/Q/HS/TA/GS, 2+ DP/PT, SILT DP/SP/S/S/TN    XR left knee performed on 2/24/2025: cemented [place] medial UKA, components in good position. Preserved lateral and PF compartments.     Assessment/Plan:  11-month status post left medial unicompartmental knee arthroplasty done on 3/4/2024 doing excellent     Assessment & Plan  Status post total knee replacement, left  -Patient is 11 months status post left medial unicompartmental knee arthroplasty done on 3/4/2024.  - continue multi-modal pain control   - Weight bearing status: As tolerated  - DVT ppx: Completed  -Continue home exercises  - F/U in 5 years for repeat Xrays and recheck  Orders:    XR knee 3 vw left non injury; Future          Scribe Attestation      I,:  Sammy Sanders am acting as a scribe while in the presence of the attending physician.:       I,:  Kyle Farrar, DO personally performed the services described in this documentation    as scribed in my presence.:

## (undated) DEVICE — IMPERVIOUS STOCKINETTE: Brand: DEROYAL

## (undated) DEVICE — GLOVE SRG BIOGEL 8.5

## (undated) DEVICE — 450 ML BOTTLE OF 0.05% CHLORHEXIDINE GLUCONATE IN 99.95% STERILE WATER FOR IRRIGATION, USP AND APPLICATOR.: Brand: IRRISEPT ANTIMICROBIAL WOUND LAVAGE

## (undated) DEVICE — SUT STRATAFIX SPIRAL PLUS 3-0 PS-2 30 X 30 CM SXMP2B408

## (undated) DEVICE — BETHLEHEM UNIV TOTAL KNEE, KIT: Brand: CARDINAL HEALTH

## (undated) DEVICE — NEEDLE 18 G X 1 1/2

## (undated) DEVICE — SUT VICRYL 1 CT-1 27 IN J261H

## (undated) DEVICE — SUT STRATAFIX SPIRAL PDS PLUS 1 CTX 18 IN SXPP1A400

## (undated) DEVICE — DRESSING MEPILEX AG BORDER POST-OP 4 X 8 IN

## (undated) DEVICE — GLOVE SRG BIOGEL 6.5

## (undated) DEVICE — SCD SEQUENTIAL COMPRESSION COMFORT SLEEVE MEDIUM KNEE LENGTH: Brand: KENDALL SCD

## (undated) DEVICE — SUT VICRYL 0 CT-1 36 IN J946H

## (undated) DEVICE — SURGICAL GOWN, XL SMARTSLEEVE: Brand: CONVERTORS

## (undated) DEVICE — BLADE SAW OXFORD

## (undated) DEVICE — WEBRIL 6 IN UNSTERILE

## (undated) DEVICE — SUT VICRYL 2-0 CT-1 36 IN J945H

## (undated) DEVICE — SPECIMEN CONTAINER STERILE PEEL PACK

## (undated) DEVICE — GLOVE INDICATOR PI UNDERGLOVE SZ 6.5 BLUE

## (undated) DEVICE — GLOVE INDICATOR PI UNDERGLOVE SZ 8.5 BLUE

## (undated) DEVICE — SYRINGE 30ML LL

## (undated) DEVICE — 4-PORT MANIFOLD: Brand: NEPTUNE 2

## (undated) DEVICE — ACE WRAP 6 IN UNSTERILE

## (undated) DEVICE — I DISPOSABLE MIXING BOWL AND SPATULA: Brand: HOWMEDICA, MIX-KIT

## (undated) DEVICE — HANDPIECE SET WITH RETRACTABLE COAXIAL FAN SPRAY TIP AND SUCTION TUBE: Brand: INTERPULSE

## (undated) DEVICE — GLOVE SRG BIOGEL ORTHOPEDIC 8.5

## (undated) DEVICE — SYRINGE CATH TIP 50ML

## (undated) DEVICE — HOOD: Brand: FLYTE, SURGICOOL

## (undated) DEVICE — COBAN 6 IN STERILE

## (undated) DEVICE — 3M™ STERI-DRAPE™ U-DRAPE 1015: Brand: STERI-DRAPE™

## (undated) DEVICE — ADHESIVE SKIN HIGH VISCOSITY EXOFIN 1ML

## (undated) DEVICE — INTENDED FOR TISSUE SEPARATION, AND OTHER PROCEDURES THAT REQUIRE A SHARP SURGICAL BLADE TO PUNCTURE OR CUT.: Brand: BARD-PARKER ® CARBON RIB-BACK BLADES

## (undated) DEVICE — CAPIT KNEE OXF UNI FM/CM TB/UNI/BLD

## (undated) DEVICE — PLUMEPEN PRO 10FT